# Patient Record
Sex: MALE | Race: OTHER | Employment: OTHER | URBAN - METROPOLITAN AREA
[De-identification: names, ages, dates, MRNs, and addresses within clinical notes are randomized per-mention and may not be internally consistent; named-entity substitution may affect disease eponyms.]

---

## 2020-02-06 PROBLEM — R97.20 ELEVATED PSA: Status: ACTIVE | Noted: 2020-02-06

## 2020-02-10 PROBLEM — R35.1 NOCTURIA: Status: ACTIVE | Noted: 2020-02-10

## 2020-02-10 PROBLEM — C61 MALIGNANT NEOPLASM OF PROSTATE (HCC): Status: ACTIVE | Noted: 2020-02-06

## 2022-01-16 ENCOUNTER — HOSPITAL ENCOUNTER (INPATIENT)
Age: 87
LOS: 2 days | Discharge: HOME HEALTH | DRG: 064 | End: 2022-01-19
Attending: EMERGENCY MEDICINE | Admitting: INTERNAL MEDICINE
Payer: MEDICARE

## 2022-01-16 DIAGNOSIS — R20.0 NUMBNESS: Primary | ICD-10-CM

## 2022-01-16 PROCEDURE — 99285 EMERGENCY DEPT VISIT HI MDM: CPT

## 2022-01-17 ENCOUNTER — APPOINTMENT (OUTPATIENT)
Dept: CT IMAGING | Age: 87
DRG: 064 | End: 2022-01-17
Attending: EMERGENCY MEDICINE
Payer: MEDICARE

## 2022-01-17 ENCOUNTER — APPOINTMENT (OUTPATIENT)
Dept: MRI IMAGING | Age: 87
DRG: 064 | End: 2022-01-17
Attending: EMERGENCY MEDICINE
Payer: MEDICARE

## 2022-01-17 ENCOUNTER — APPOINTMENT (OUTPATIENT)
Dept: GENERAL RADIOLOGY | Age: 87
DRG: 064 | End: 2022-01-17
Attending: INTERNAL MEDICINE
Payer: MEDICARE

## 2022-01-17 PROBLEM — I63.9 CVA (CEREBRAL VASCULAR ACCIDENT) (HCC): Status: ACTIVE | Noted: 2022-01-17

## 2022-01-17 LAB
ALBUMIN SERPL-MCNC: 4.1 G/DL (ref 3.4–5)
ALBUMIN/GLOB SERPL: 1.1 {RATIO} (ref 0.8–1.7)
ALP SERPL-CCNC: 156 U/L (ref 45–117)
ALT SERPL-CCNC: 36 U/L (ref 16–61)
ANION GAP SERPL CALC-SCNC: 9 MMOL/L (ref 3–18)
AST SERPL-CCNC: 44 U/L (ref 10–38)
BASOPHILS # BLD: 0 K/UL (ref 0–0.1)
BASOPHILS NFR BLD: 1 % (ref 0–2)
BILIRUB SERPL-MCNC: 0.6 MG/DL (ref 0.2–1)
BUN SERPL-MCNC: 13 MG/DL (ref 7–18)
BUN/CREAT SERPL: 11 (ref 12–20)
CALCIUM SERPL-MCNC: 9.1 MG/DL (ref 8.5–10.1)
CHLORIDE SERPL-SCNC: 106 MMOL/L (ref 100–111)
CO2 SERPL-SCNC: 26 MMOL/L (ref 21–32)
CREAT SERPL-MCNC: 1.17 MG/DL (ref 0.6–1.3)
DIFFERENTIAL METHOD BLD: ABNORMAL
EOSINOPHIL # BLD: 0.1 K/UL (ref 0–0.4)
EOSINOPHIL NFR BLD: 2 % (ref 0–5)
ERYTHROCYTE [DISTWIDTH] IN BLOOD BY AUTOMATED COUNT: 14.6 % (ref 11.6–14.5)
EST. AVERAGE GLUCOSE BLD GHB EST-MCNC: 123 MG/DL
FOLATE SERPL-MCNC: 17.4 NG/ML (ref 3.1–17.5)
GLOBULIN SER CALC-MCNC: 3.6 G/DL (ref 2–4)
GLUCOSE SERPL-MCNC: 114 MG/DL (ref 74–99)
HBA1C MFR BLD: 5.9 % (ref 4.2–5.6)
HCT VFR BLD AUTO: 44.6 % (ref 36–48)
HGB BLD-MCNC: 14.9 G/DL (ref 13–16)
IMM GRANULOCYTES # BLD AUTO: 0 K/UL (ref 0–0.04)
IMM GRANULOCYTES NFR BLD AUTO: 0 % (ref 0–0.5)
INR PPP: 1.2 (ref 0.8–1.2)
LYMPHOCYTES # BLD: 1 K/UL (ref 0.9–3.6)
LYMPHOCYTES NFR BLD: 18 % (ref 21–52)
MCH RBC QN AUTO: 29.4 PG (ref 24–34)
MCHC RBC AUTO-ENTMCNC: 33.4 G/DL (ref 31–37)
MCV RBC AUTO: 88.1 FL (ref 78–100)
MONOCYTES # BLD: 0.6 K/UL (ref 0.05–1.2)
MONOCYTES NFR BLD: 10 % (ref 3–10)
NEUTS SEG # BLD: 3.9 K/UL (ref 1.8–8)
NEUTS SEG NFR BLD: 69 % (ref 40–73)
NRBC # BLD: 0 K/UL (ref 0–0.01)
NRBC BLD-RTO: 0 PER 100 WBC
PLATELET # BLD AUTO: 386 K/UL (ref 135–420)
PMV BLD AUTO: 8.9 FL (ref 9.2–11.8)
POTASSIUM SERPL-SCNC: 3.3 MMOL/L (ref 3.5–5.5)
PROT SERPL-MCNC: 7.7 G/DL (ref 6.4–8.2)
PROTHROMBIN TIME: 14.1 SEC (ref 11.5–15.2)
RBC # BLD AUTO: 5.06 M/UL (ref 4.35–5.65)
SODIUM SERPL-SCNC: 141 MMOL/L (ref 136–145)
TROPONIN-HIGH SENSITIVITY: 43 NG/L (ref 0–78)
VIT B12 SERPL-MCNC: 274 PG/ML (ref 211–911)
WBC # BLD AUTO: 5.7 K/UL (ref 4.6–13.2)

## 2022-01-17 PROCEDURE — 74011250637 HC RX REV CODE- 250/637: Performed by: INTERNAL MEDICINE

## 2022-01-17 PROCEDURE — 70551 MRI BRAIN STEM W/O DYE: CPT

## 2022-01-17 PROCEDURE — 97162 PT EVAL MOD COMPLEX 30 MIN: CPT

## 2022-01-17 PROCEDURE — 65660000000 HC RM CCU STEPDOWN

## 2022-01-17 PROCEDURE — 82607 VITAMIN B-12: CPT

## 2022-01-17 PROCEDURE — 85610 PROTHROMBIN TIME: CPT

## 2022-01-17 PROCEDURE — 92610 EVALUATE SWALLOWING FUNCTION: CPT

## 2022-01-17 PROCEDURE — 85025 COMPLETE CBC W/AUTO DIFF WBC: CPT

## 2022-01-17 PROCEDURE — 84484 ASSAY OF TROPONIN QUANT: CPT

## 2022-01-17 PROCEDURE — 70496 CT ANGIOGRAPHY HEAD: CPT

## 2022-01-17 PROCEDURE — 80053 COMPREHEN METABOLIC PANEL: CPT

## 2022-01-17 PROCEDURE — 74011000636 HC RX REV CODE- 636: Performed by: EMERGENCY MEDICINE

## 2022-01-17 PROCEDURE — 70450 CT HEAD/BRAIN W/O DYE: CPT

## 2022-01-17 PROCEDURE — 97535 SELF CARE MNGMENT TRAINING: CPT

## 2022-01-17 PROCEDURE — 97116 GAIT TRAINING THERAPY: CPT

## 2022-01-17 PROCEDURE — 74011250637 HC RX REV CODE- 250/637: Performed by: EMERGENCY MEDICINE

## 2022-01-17 PROCEDURE — 71045 X-RAY EXAM CHEST 1 VIEW: CPT

## 2022-01-17 PROCEDURE — 97166 OT EVAL MOD COMPLEX 45 MIN: CPT

## 2022-01-17 PROCEDURE — 83036 HEMOGLOBIN GLYCOSYLATED A1C: CPT

## 2022-01-17 PROCEDURE — 74011250636 HC RX REV CODE- 250/636: Performed by: INTERNAL MEDICINE

## 2022-01-17 RX ORDER — ASPIRIN 325 MG
325 TABLET ORAL
Status: COMPLETED | OUTPATIENT
Start: 2022-01-17 | End: 2022-01-17

## 2022-01-17 RX ORDER — SODIUM CHLORIDE 9 MG/ML
25 INJECTION, SOLUTION INTRAVENOUS AS NEEDED
Status: DISCONTINUED | OUTPATIENT
Start: 2022-01-17 | End: 2022-01-19 | Stop reason: HOSPADM

## 2022-01-17 RX ORDER — ATORVASTATIN CALCIUM 20 MG/1
80 TABLET, FILM COATED ORAL
Status: DISCONTINUED | OUTPATIENT
Start: 2022-01-17 | End: 2022-01-19 | Stop reason: HOSPADM

## 2022-01-17 RX ORDER — GUAIFENESIN 100 MG/5ML
81 LIQUID (ML) ORAL DAILY
Status: DISCONTINUED | OUTPATIENT
Start: 2022-01-17 | End: 2022-01-17

## 2022-01-17 RX ORDER — TRAZODONE HYDROCHLORIDE 100 MG/1
100 TABLET ORAL
Status: DISCONTINUED | OUTPATIENT
Start: 2022-01-17 | End: 2022-01-19 | Stop reason: HOSPADM

## 2022-01-17 RX ORDER — CLOPIDOGREL BISULFATE 75 MG/1
300 TABLET ORAL ONCE
Status: COMPLETED | OUTPATIENT
Start: 2022-01-17 | End: 2022-01-17

## 2022-01-17 RX ORDER — CLOPIDOGREL BISULFATE 75 MG/1
75 TABLET ORAL DAILY
Status: DISCONTINUED | OUTPATIENT
Start: 2022-01-18 | End: 2022-01-19 | Stop reason: HOSPADM

## 2022-01-17 RX ORDER — HEPARIN SODIUM 5000 [USP'U]/ML
5000 INJECTION, SOLUTION INTRAVENOUS; SUBCUTANEOUS EVERY 8 HOURS
Status: DISCONTINUED | OUTPATIENT
Start: 2022-01-17 | End: 2022-01-19 | Stop reason: HOSPADM

## 2022-01-17 RX ORDER — GUAIFENESIN 100 MG/5ML
81 LIQUID (ML) ORAL DAILY
Status: DISCONTINUED | OUTPATIENT
Start: 2022-01-17 | End: 2022-01-19 | Stop reason: HOSPADM

## 2022-01-17 RX ORDER — ACETAMINOPHEN 325 MG/1
650 TABLET ORAL
Status: DISCONTINUED | OUTPATIENT
Start: 2022-01-17 | End: 2022-01-19 | Stop reason: HOSPADM

## 2022-01-17 RX ORDER — SODIUM CHLORIDE 9 MG/ML
125 INJECTION, SOLUTION INTRAVENOUS CONTINUOUS
Status: DISCONTINUED | OUTPATIENT
Start: 2022-01-17 | End: 2022-01-19 | Stop reason: HOSPADM

## 2022-01-17 RX ORDER — GABAPENTIN 300 MG/1
300 CAPSULE ORAL 2 TIMES DAILY
Status: DISCONTINUED | OUTPATIENT
Start: 2022-01-17 | End: 2022-01-19 | Stop reason: HOSPADM

## 2022-01-17 RX ORDER — ALBUTEROL SULFATE 90 UG/1
2 AEROSOL, METERED RESPIRATORY (INHALATION)
Status: DISCONTINUED | OUTPATIENT
Start: 2022-01-17 | End: 2022-01-19 | Stop reason: HOSPADM

## 2022-01-17 RX ADMIN — ATORVASTATIN CALCIUM 80 MG: 20 TABLET, FILM COATED ORAL at 22:45

## 2022-01-17 RX ADMIN — HEPARIN SODIUM 5000 UNITS: 5000 INJECTION INTRAVENOUS; SUBCUTANEOUS at 08:00

## 2022-01-17 RX ADMIN — ALBUTEROL SULFATE 2 PUFF: 108 INHALANT RESPIRATORY (INHALATION) at 16:00

## 2022-01-17 RX ADMIN — ALBUTEROL SULFATE 2 PUFF: 108 INHALANT RESPIRATORY (INHALATION) at 08:49

## 2022-01-17 RX ADMIN — IOPAMIDOL 100 ML: 755 INJECTION, SOLUTION INTRAVENOUS at 07:16

## 2022-01-17 RX ADMIN — ALBUTEROL SULFATE 2 PUFF: 108 INHALANT RESPIRATORY (INHALATION) at 22:46

## 2022-01-17 RX ADMIN — TRAZODONE HYDROCHLORIDE 100 MG: 100 TABLET ORAL at 22:45

## 2022-01-17 RX ADMIN — CLOPIDOGREL BISULFATE 300 MG: 75 TABLET ORAL at 06:57

## 2022-01-17 RX ADMIN — GABAPENTIN 300 MG: 300 CAPSULE ORAL at 22:45

## 2022-01-17 RX ADMIN — HEPARIN SODIUM 5000 UNITS: 5000 INJECTION INTRAVENOUS; SUBCUTANEOUS at 16:57

## 2022-01-17 RX ADMIN — ACETAMINOPHEN 650 MG: 325 TABLET ORAL at 22:44

## 2022-01-17 RX ADMIN — ASPIRIN 325 MG ORAL TABLET 325 MG: 325 PILL ORAL at 06:57

## 2022-01-17 RX ADMIN — SODIUM CHLORIDE 125 ML/HR: 9 INJECTION, SOLUTION INTRAVENOUS at 07:58

## 2022-01-17 RX ADMIN — GABAPENTIN 300 MG: 300 CAPSULE ORAL at 10:20

## 2022-01-17 RX ADMIN — LEVOTHYROXINE SODIUM 175 MCG: 0.1 TABLET ORAL at 08:49

## 2022-01-17 RX ADMIN — ALBUTEROL SULFATE 2 PUFF: 108 INHALANT RESPIRATORY (INHALATION) at 13:48

## 2022-01-17 RX ADMIN — HEPARIN SODIUM 5000 UNITS: 5000 INJECTION INTRAVENOUS; SUBCUTANEOUS at 22:45

## 2022-01-17 NOTE — ED TRIAGE NOTES
Pt arrives to ed per reporting left sided tingling that began two days ago. pt received covid vaccine on Thursday.  Pt does have slight speech stutter which is abnormal.

## 2022-01-17 NOTE — ED PROVIDER NOTES
I took over care of this patient from KATHIE Dickerson at normal turnover shift. Patient presented to the emergency department with 48 hours of extremity numbness he has a history of hypertension, prior CVAs, prostate cancer. He contributed this to his COVID-19 booster shot which was 3 days ago. He was worked up and on CT scan there was a indeterminate lesion read as a possible lacunar infarct of unknown timeline. MRI was done and awaiting results. MRI does show acute/subacute ischemia in the PCA distribution. Patient is 48 hours out from onset of symptoms and thus was never a tPA candidate. Rosana with neurology who asked for a CTA of the head and neck. Patient will be admitted for further investigation and treatment for acute stroke.

## 2022-01-17 NOTE — PROGRESS NOTES
Problem: Mobility Impaired (Adult and Pediatric)  Goal: *Acute Goals and Plan of Care (Insert Text)  Description: Physical Therapy Goals   Initiated 2022 and to be accomplished within 5-7 day(s)  1. Patient will move from supine <> sit with mod I in prep for out of bed activity and change of position. 2.  Patient will perform sit<> stand with mod I with LRAD in prep for transfers/ambulation. 3.  Patient will transfer from bed <> chair with mod I with LRAD for time up in chair for completion of ADL activity. 4.  Patient will ambulate 100 feet with mod I/LRAD for improved functional mobility at discharge. 5.  Patient will ascend/descend 3-5 stairs with handrail(s) with contact guard assist/S for home re-entry as needed. Outcome: Progressing Towards Goal    PHYSICAL THERAPY EVALUATION    Patient: Irma Martínez (89 y.o. male)  Date: 2022  Primary Diagnosis: CVA (cerebral vascular accident) [facility]) [I63.9]  Precautions:   Fall  PLOF: independent amb w/o AD     ASSESSMENT :  Based on the objective data described below, the patient is seen on telemetry unit. Pt presents s/p CVA with MRI revealin. Patchy areas of acute infarction involving the right posterior cerebral artery distribution, and 2. Mild chronic small vessel changes with old left basal ganglia/corona radiata infarct. Pt presents with IV, Ostomy bag L abdomen, and reports limited vision L eye due to macular degeneration. Pt with decr'd independence in functional mobility r/t L side weakness, decr'd standing balance/coordination mildly and mild decr'd safety awareness. Pt supine<>sit<>stand with supervision/CGA. Able to participate with GT/RW/CGA 70ft with fair balance, decr'd foot clearance and occasion decr'd coordination L LE during swing. Pt returned to room and c/o painful lump mid scapula L. Area exquisitely TTP. Nurse Herbert Aldrich in to observe same. Pain 0/10 at rest; 8/10 with palpation or pressure from lying on back.   Area present ~6 mths per pt. Pt left in bed with all needs in reach, and nurse Candace Holt aware. Answered pt questions regarding PT and mobility. Recommend HHPT vs OP PT (pending progress) for follow up physical therapy upon discharge to reach maximal level of independence/safety with functional mobility. Patient will benefit from skilled intervention to address the above impairments. Pt Education: Role of physical therapy in acute care setting, fall prevention and safety/technique during functional mobility tasks    Patient's rehabilitation potential is considered to be Good  Factors which may influence rehabilitation potential include:   []         None noted  []         Mental ability/status  [x]         Medical condition  []         Home/family situation and support systems  [x]         Safety awareness  []         Pain tolerance/management  []         Other:      PLAN :  Recommendations and Planned Interventions:   [x]           Bed Mobility Training             [x]    Neuromuscular Re-Education  [x]           Transfer Training                   []    Orthotic/Prosthetic Training  [x]           Gait Training                          []    Modalities  [x]           Therapeutic Exercises           []    Edema Management/Control  [x]           Therapeutic Activities            []    Family Training/Education  [x]           Patient Education  []           Other (comment):    Frequency/Duration: Patient will be followed by physical therapy 1-2 times per day/4-7 days per week to address goals. Discharge Recommendations: Home Physical Therapy and Outpatient  Further Equipment Recommendations for Discharge: N/A     SUBJECTIVE:   Patient stated I think I'm doing alright. 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)     prostate and thyroid    Chest pain, precordial     Dementia (HCC)     DJD (degenerative joint disease)     Hyperlipidemia Hypertension     Stroke [facility])     Syncope     Thyroid disease     cancer- twice     Past Surgical History:   Procedure Laterality Date    HX CARPAL TUNNEL RELEASE      twice    HX HIP REPLACEMENT      two    HX ORTHOPAEDIC      bilateral hip surgery    HX PROSTATECTOMY      cancer    HX THORACOTOMY      dx:  amyloidosis    [place] REPAIR ROTATOR CUFF,CHRONIC      three times    THYROIDECTOMY      cancer     Barriers to Learning/Limitations: yes;  sensory deficits-vision/hearing/speech, physical, and other reports h/o macular degeneration L eye per pt  Compensate with: Visual Cues, Verbal Cues, and Tactile Cues  Home Situation:  Home Situation  Home Environment: Trailer/mobile home  # Steps to Enter: 5  Rails to Enter: Yes  Hand Rails : Bilateral  One/Two Story Residence: One story  Living Alone: No  Support Systems: Spouse/Significant Other  Patient Expects to be Discharged to[de-identified] Trailer/mobile home  Current DME Used/Available at Home: [place] Or[employer], rolling,Walker, rollator  Tub or Shower Type: Tub/Shower combination  Critical Behavior:  Neurologic State: Alert; Appropriate for age  Orientation Level: Oriented X4  Cognition: Appropriate decision making; Appropriate for age attention/concentration; Follows commands  Safety/Judgement: Awareness of environment; Fall prevention  Psychosocial  Patient Behaviors: Calm; Cooperative  Purposeful Interaction: Yes  Pt Identified Daily Priority: Clinical issues (comment)  [employer] Process: Teaching/learning;Establish trust;Attend basic human needs  Caring Interventions: Reassure  Reassure:  Therapeutic listening  Therapeutic Modalities: Deep breathing  Strength:    Strength: Generally decreased, functional (R LE grossly 5/5, L LE grossly 4 to+/5)  Manual Muscle Testing (LE)                                                  R                    L                     Hip Flexion:                         5  /5                 4+  /5  Knee EXT:                             5 /5                  5 /5  Knee FLEX:                           5 /5                  4+ /5  Ankle DF:                              5 /5                   4+/5  Tone & Sensation:   Tone: Normal  Sensation: Intact (reports numbness L side from bottom lip to toes)  Range Of Motion:  AROM: Within functional limits (BLE's)  Functional Mobility:  Bed Mobility:  Supine to Sit: Supervision  Sit to Supine: Supervision  Scooting: Supervision  Transfers:  Sit to Stand: Contact guard assistance;Stand-by assistance  Stand to Sit: Contact guard assistance;Stand-by assistance  Balance:  Sitting: Intact  Standing: With support; Impaired  Standing - Static: Good;Fair  Standing - Dynamic : Fair  Ambulation/Gait Training:  Distance (ft): 70 Feet (ft)  Assistive Device: Gait belt;Walker, rolling  Ambulation - Level of Assistance: Contact guard assistance  Gait Description (WDL): Exceptions to WDL  Gait Abnormalities: Decreased step clearance  Speed/Kristel: Pace decreased (<100 feet/min)  Interventions: Safety awareness training; Tactile cues; Verbal cues; Visual/Demos  Pain:  Pain level pre-treatment: 0/10  Pain level post-treatment: 5/10 post R mid scapula-lump noted and nurse Candace Holt in to observe  Pain Intervention(s) : Medication (see MAR); Rest, Ice, Repositioning  Response to intervention: Nurse notified, See doc flow  Activity Tolerance:   Fair   Please refer to the flowsheet for vital signs taken during this treatment. After treatment:   []         Patient left in no apparent distress sitting up in chair  [x]         Patient left in no apparent distress in bed  [x]         Call bell left within reach  [x]         Nursing notified  []         Caregiver present  []         Bed alarm activated  []         SCDs applied    COMMUNICATION/EDUCATION:   [x]         Role of Physical Therapy in the acute care setting. [x]         Fall prevention education was provided and the patient/caregiver indicated understanding.   [x]         Patient/family have participated as able in goal setting and plan of care. [x]         Patient/family agree to work toward stated goals and plan of care. []         Patient understands intent and goals of therapy, but is neutral about his/her participation. []         Patient is unable to participate in goal setting/plan of care: ongoing with therapy staff.  []         Other:     Thank you for this referral.  Marivel Bernal, PT   Time Calculation: 33 mins      Eval Complexity: History: Exam:HIGH Complexity : 4+ Standardized tests and measures addressing body structure, function, activity limitation and / or participation in recreation  Presentation: MEDIUM Complexity : Evolving with changing characteristics  Clinical Decision Making:Medium Complexity    Overall Complexity:MEDIUM

## 2022-01-17 NOTE — PROGRESS NOTES
Reason for Admission:  Chart reviewed; per H&P, patient is a \"82 y.o.  male who history of prostate colostomy hypertension presents to the emergency room with left-sided numbness and tingling that started in the face left arm and left leg patient's symptoms started on Friday and has not progressed with some dysarthria. Patient received a COVID booster [place] on Thursday 24 hours prior to onset of symptoms. Patient admits to not being compliant with his hypertensive and cholesterol medicine he has not been taking aspirin.    \"                     RUR Score:      Low, 9%               Plan for utilizing home health:    TBD      PCP: First and Last name:  Ady Valentine DO     Name of Practice:    Are you a current patient: Yes/No:    Approximate date of last visit:    Can you participate in a virtual visit with your PCP:                     Current Advanced Directive/Advance Care Plan: Full Code      Healthcare Decision Maker:   Click here to complete [id number] including selection of the Healthcare Decision Maker Relationship (ie \"Primary\")                             Transition of Care Plan:

## 2022-01-17 NOTE — ED PROVIDER NOTES
EMERGENCY DEPARTMENT HISTORY AND PHYSICAL EXAM    Date: 1/16/2022  Patient Name: Ani So    History of Presenting Illness     Chief Complaint   Patient presents with    Numbness         History Provided By: Patient    Additional History (Context): Ani So is a 82 y.o. male with hypertension, hyperlipidemia, stroke and malignancy who presents with complaint of left-sided numbness from his face to his legs. Patient said that this began 48 hours ago approximately 1 day after getting the COVID vaccination booster. Denies weakness. Denies gait instability changes in speech or vision. Denies strength change. He says he just feels like his body is asleep on the left side. PCP: Radha Reddy, DO    Current Outpatient Medications   Medication Sig Dispense Refill    docusate sodium (COLACE) 100 mg capsule Take 100 mg by mouth two (2) times a day.  gabapentin (NEURONTIN) 300 mg capsule Take 300 mg by mouth two (2) times a day.  naproxen (NAPROSYN) 375 mg tablet       omega 3-dha-epa-fish oil 60- mg cap Take 500 mg by mouth.  pravastatin (PRAVACHOL) 40 mg tablet       ascorbic acid, vitamin C, (VITAMIN C) 100 mg tab VITAMIN C TABS      ibuprofen (MOTRIN) 800 mg tablet       metoprolol tartrate (LOPRESSOR) 25 mg tablet Take 1 Tab by mouth.  traZODone (DESYREL) 150 mg tablet       co-enzyme Q-10 (CO Q-10) 100 mg capsule       lisinopril (PRINIVIL, ZESTRIL) 20 mg tablet Take  by mouth daily.  multivitamin (ONE A DAY) tablet Take 1 Tab by mouth daily.  aspirin 81 mg chewable tablet Take 81 mg by mouth daily.  AMLODIPINE BESYLATE (NORVASC PO) Take  by mouth.  levothyroxine (LEVOTHROID) 175 mcg tablet Take 175 mcg by mouth Daily (before breakfast).            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)     prostate and thyroid    Chest pain, precordial     [facility]     DJD (degenerative joint disease)     Hyperlipidemia     Hypertension     Stroke ([facility]     Syncope     Thyroid disease     cancer- twice       Past Surgical History:  Past Surgical History:   Procedure Laterality Date    HX CARPAL TUNNEL RELEASE      twice    HX HIP REPLACEMENT      two    HX ORTHOPAEDIC      bilateral hip surgery    HX PROSTATECTOMY      cancer    HX THORACOTOMY      dx:  amyloidosis    [place] REPAIR ROTATOR CUFF,CHRONIC      three times    THYROIDECTOMY      cancer       Family History:  No family history on file. Social History:  Social History     Tobacco Use    Smoking status: Never Smoker    Smokeless tobacco: Never Used   Substance Use Topics    Alcohol use: No    Drug use: No       Allergies: Allergies   Allergen Reactions    Morphine Other (comments)     \"goes crazy\"         Review of Systems   Review of Systems   Constitutional: Negative for fever. HENT: Negative. Eyes: Negative. Negative for visual disturbance. Cardiovascular: Negative for chest pain. Gastrointestinal: Negative. Negative for nausea and vomiting. Endocrine: Negative. Genitourinary: Negative. Musculoskeletal: Negative for gait problem and myalgias. Skin: Negative. Neurological: Positive for numbness. Negative for dizziness, seizures, syncope, speech difficulty, weakness and light-headedness. Hematological: Does not bruise/bleed easily. Psychiatric/Behavioral: Negative. All Other Systems Negative  Physical Exam     Vitals:    01/16/22 2309 01/17/22 0138   BP: (!) 162/93    Pulse: 85    Resp: 18    Temp:  98.2 °F (36.8 °C)   SpO2:  98%     Physical Exam  Vitals and nursing note reviewed. Constitutional:       General: He is not in acute distress. Appearance: He is well-developed. He is not ill-appearing, toxic-appearing or diaphoretic. HENT:      Head: Normocephalic and atraumatic. Eyes:      Extraocular Movements: Extraocular movements intact. Comments: No nystagmus. Neck:      Thyroid: No thyromegaly. Vascular: No carotid bruit. Trachea: No tracheal deviation. Cardiovascular:      Rate and Rhythm: Normal rate and regular rhythm. Pulses: Normal pulses. Heart sounds: Normal heart sounds. No murmur heard. No friction rub. No gallop. Pulmonary:      Effort: Pulmonary effort is normal. No respiratory distress. Breath sounds: Normal breath sounds. No stridor. No wheezing or rales. Chest:      Chest wall: No tenderness. Abdominal:      General: There is no distension. Palpations: Abdomen is soft. There is no mass. Tenderness: There is no abdominal tenderness. There is no guarding or rebound. Musculoskeletal:         General: Normal range of motion. Cervical back: Normal range of motion and neck supple. Skin:     General: Skin is warm and dry. Coloration: Skin is not pale. Neurological:      Mental Status: He is alert and oriented to person, place, and time. Cranial Nerves: No cranial nerve deficit. Sensory: No sensory deficit. Motor: No weakness. Coordination: Coordination normal.      Comments: Subjective only perception of sensation to light touch from left to right. Psychiatric:         Speech: Speech normal.         Behavior: Behavior normal.         Thought Content:  Thought content normal.         Judgment: Judgment normal.          Diagnostic Study Results     Labs -     Recent Results (from the past 12 hour(s))   CBC WITH AUTOMATED DIFF    Collection Time: 01/17/22 12:09 AM   Result Value Ref Range    WBC 5.7 4.6 - 13.2 K/uL    RBC 5.06 4.35 - 5.65 M/uL    HGB 14.9 13.0 - 16.0 g/dL    HCT 44.6 36.0 - 48.0 %    MCV 88.1 78.0 - 100.0 FL    MCH 29.4 24.0 - 34.0 PG    MCHC 33.4 31.0 - 37.0 g/dL    RDW 14.6 (H) 11.6 - 14.5 %    PLATELET 249 896 - 208 K/uL    MPV 8.9 (L) 9.2 - 11.8 FL    NRBC 0.0 0  WBC    ABSOLUTE NRBC 0.00 0.00 - 0.01 K/uL    NEUTROPHILS 69 40 - 73 % LYMPHOCYTES 18 (L) 21 - 52 %    MONOCYTES 10 3 - 10 %    EOSINOPHILS 2 0 - 5 %    BASOPHILS 1 0 - 2 %    IMMATURE GRANULOCYTES 0 0.0 - 0.5 %    ABS. NEUTROPHILS 3.9 1.8 - 8.0 K/UL    ABS. LYMPHOCYTES 1.0 0.9 - 3.6 K/UL    ABS. MONOCYTES 0.6 0.05 - 1.2 K/UL    ABS. EOSINOPHILS 0.1 0.0 - 0.4 K/UL    ABS. BASOPHILS 0.0 0.0 - 0.1 K/UL    ABS. IMM. GRANS. 0.0 0.00 - 0.04 K/UL    DF AUTOMATED     METABOLIC PANEL, COMPREHENSIVE    Collection Time: 01/17/22 12:09 AM   Result Value Ref Range    Sodium 141 136 - 145 mmol/L    Potassium 3.3 (L) 3.5 - 5.5 mmol/L    Chloride 106 100 - 111 mmol/L    CO2 26 21 - 32 mmol/L    Anion gap 9 3.0 - 18 mmol/L    Glucose 114 (H) 74 - 99 mg/dL    BUN 13 7.0 - 18 MG/DL    Creatinine 1.17 0.6 - 1.3 MG/DL    BUN/Creatinine ratio 11 (L) 12 - 20      GFR est AA >60 >60 ml/min/1.73m2    GFR est non-AA 59 (L) >60 ml/min/1.73m2    Calcium 9.1 8.5 - 10.1 MG/DL    Bilirubin, total 0.6 0.2 - 1.0 MG/DL    ALT (SGPT) 36 16 - 61 U/L    AST (SGOT) 44 (H) 10 - 38 U/L    Alk. phosphatase 156 (H) 45 - 117 U/L    Protein, total 7.7 6.4 - 8.2 g/dL    Albumin 4.1 3.4 - 5.0 g/dL    Globulin 3.6 2.0 - 4.0 g/dL    A-G Ratio 1.1 0.8 - 1.7     TROPONIN-HIGH SENSITIVITY    Collection Time: 01/17/22 12:09 AM   Result Value Ref Range    Troponin-High Sensitivity 43 0 - 78 ng/L   PROTHROMBIN TIME + INR    Collection Time: 01/17/22 12:09 AM   Result Value Ref Range    Prothrombin time 14.1 11.5 - 15.2 sec    INR 1.2 0.8 - 1.2         Radiologic Studies -   CT HEAD WO CONT   Final Result      Cerebral volume loss and mild bilateral periventricular and central white matter   diminished attenuation which is nonspecific but likely to represent   microvascular disease. Posterior lateral right thalamic/posterior right internal capsular small   hypodensity possibly a lacunar infarct of uncertain acuity. Small right occipital hypodensity possibly an old infarct.             MRI BRAIN WO CONT    (Results Pending) CT Results  (Last 48 hours)               01/17/22 0040  CT HEAD WO CONT Final result    Impression:      Cerebral volume loss and mild bilateral periventricular and central white matter   diminished attenuation which is nonspecific but likely to represent   microvascular disease. Posterior lateral right thalamic/posterior right internal capsular small   hypodensity possibly a lacunar infarct of uncertain acuity. Small right occipital hypodensity possibly an old infarct. Narrative:  EXAM: CT head       INDICATION: Left-sided numbness for 48 hours. COMPARISON: November 29, 2013. TECHNIQUE: Axial CT imaging of the head was performed without intravenous   contrast. Dose reduction techniques used: automated exposure control, adjustment   of the mAs and/or kVp according to patient size, and iterative reconstruction   techniques. Digital imaging and communications in Medicine (DICOM) format image   data are available to nonaffiliated external healthcare facilities or entities   on a secure, media free, reciprocally searchable basis with patient   authorization for at least 12 months after this study. _______________       FINDINGS:       BRAIN AND POSTERIOR FOSSA: There is cerebral volume loss with prominence of the   lateral and the third ventricles. The cortical sulci are widened appropriately. The fourth ventricle and basal cisterns are normally outlined. There is mild   bilateral periventricular and central white matter diminished attenuation. There   is a small hypodensity lateral posterior right thalamus/posterior right internal   capsule. There is a hypodensity right occipital lobe. There is no hemorrhage or   midline shift. EXTRA-AXIAL SPACES AND MENINGES: There are no abnormal extra-axial fluid   collections. CALVARIUM: Intact. SINUSES: Clear.        OTHER: None.       _______________               CXR Results  (Last 48 hours)    None Medical Decision Making   I am the first provider for this patient. I reviewed the vital signs, available nursing notes, past medical history, past surgical history, family history and social history. Vital Signs-Reviewed the patient's vital signs. Records Reviewed: Nursing Notes    Procedures:  Procedures    Provider Notes (Medical Decision Making): Patient with possible right-sided internal capsule thalamic infarct of questionable acuity. Will obtain MRI. Symptoms have been present now over 48 hours. Labs blood sugar unremarkable otherwise. 1:39 AM : Pt care transferred to Dr. Bertha Sim ,ED provider. History of patient complaint(s), available diagnostic reports and current treatment plan has been discussed thoroughly. Bedside rounding on patient occured : yes . Intended disposition of patient : TBD  Pending diagnostics reports and/or labs (please list): MRI    MED RECONCILIATION:  No current facility-administered medications for this encounter. Current Outpatient Medications   Medication Sig    docusate sodium (COLACE) 100 mg capsule Take 100 mg by mouth two (2) times a day.  gabapentin (NEURONTIN) 300 mg capsule Take 300 mg by mouth two (2) times a day.  naproxen (NAPROSYN) 375 mg tablet     omega 3-dha-epa-fish oil 60- mg cap Take 500 mg by mouth.  pravastatin (PRAVACHOL) 40 mg tablet     ascorbic acid, vitamin C, (VITAMIN C) 100 mg tab VITAMIN C TABS    ibuprofen (MOTRIN) 800 mg tablet     metoprolol tartrate (LOPRESSOR) 25 mg tablet Take 1 Tab by mouth.  traZODone (DESYREL) 150 mg tablet     co-enzyme Q-10 (CO Q-10) 100 mg capsule     lisinopril (PRINIVIL, ZESTRIL) 20 mg tablet Take  by mouth daily.  multivitamin (ONE A DAY) tablet Take 1 Tab by mouth daily.  aspirin 81 mg chewable tablet Take 81 mg by mouth daily.  AMLODIPINE BESYLATE (NORVASC PO) Take  by mouth.     levothyroxine (LEVOTHROID) 175 mcg tablet Take 175 mcg by mouth Daily (before breakfast). Disposition:  TBD      Follow-up Information     Follow up With Specialties Details Why Contact Info    Arden Novoa DO Family Medicine Schedule an appointment as soon as possible for a visit   [address] [place] [address]      THE [facility] EMERGENCY DEPT Emergency Medicine  If symptoms worsen return immediately [address] [place] [address]  [phone]          Current Discharge Medication List                Diagnosis     Clinical Impression:   1.  Numbness

## 2022-01-17 NOTE — CONSULTS
NEUROLOGY CONSULTATION NOTE    Patient: Jocelyne Lui MRN: [id number]  CSN: [id number]    YOB: 1935  Age: 82 y.o. Sex: male    DOA: 1/16/2022 LOS:  LOS: 0 days        Requesting Physician: Dr. Padma Campos and Dr. Sarah Suarez. Reason for Consultation: Stroke. HISTORY OF PRESENT ILLNESS:   Jocelyne Lui is a 82 y.o. right-handed male with history of prostate cancer, amyloidosis, memory impairment, prior stroke, hypertension and hyperlipidemia, who presented with left-sided numbness after booster vaccine for coronavirus. He started to feel numbness on the left side of his body including left arm, leg and left side of his mouth. He related this to coronavirus vaccine. Therefore, he did not present to ER for about 48 hours. When he presented, he was not a candidate for any intervention. Of note, the patient denies any weakness. He was supposed to be on aspirin but he is not taking aspirin. He underwent a head CT, which showed age-indeterminate infarcts and MRI showed multifocal infarcts on the right hemisphere including right thalamus and right PCA distribution infarcts. He was given a loading dose of aspirin and Plavix. The patient denies any vision changes, speech or swallowing difficulties. He denies remarkable weakness. Stroke Work-up:  Brain MRI:   1. Patchy areas of acute infarction involving the right posterior cerebral artery distribution, as described above. 2.  Mild chronic small vessel changes with old left basal ganglia/corona radiata infarct. CT Head: Cerebral volume loss and mild bilateral periventricular and central white matter diminished attenuation which is nonspecific but likely to represent microvascular disease. Posterior lateral right thalamic/posterior right internal capsular small hypodensity possibly a lacunar infarct of uncertain acuity. Small right occipital hypodensity possibly an old infarct. CTA of head and neck: 1.   Occlusion of the P2 segment of the right posterior cerebral artery, corresponding to the location of acute infarction on MRI. 2.  Atherosclerotic vascular disease with mild areas of stenosis in the neck and involving the petrocavernous junction of the right internal carotid artery. 3.  Postsurgical changes in the left upper lobe with atelectasis, scarring, and/or infiltrate. Dedicated imaging of the chest is suggested for further evaluation, as clinically warranted. Stenosis Key: Mild-less than 25% Mild to moderate- 25-50% Moderate- 40-60% Moderate to severe- 50-75% Severe-greater than 75%  Echocardiogram:   Lipid panel: No results found for: CHOL, CHOLPOCT, CHOLX, CHLST, CHOLV, [id number], HDL, HDLP, LDL, LDLC, DLDLP, [id number], VLDLC, VLDL, TGLX, TRIGL, TRIGP, TGLPOCT, CHHD, CHHDX  HbA1c:   No results found for: HBA1C, FPL2CBZY, [id number]  PAST MEDICAL HISTORY:  Past Medical History:   Diagnosis Date    Amyloidosis of lung ([facility] [address] 2012    Cancer [facility])     prostate and thyroid    Chest pain, precordial     Dementia ([facility] [address]     DJD (degenerative joint disease)     Hyperlipidemia     Hypertension     Stroke ([facility] [address]     Syncope     Thyroid disease     cancer- twice     PAST SURGICAL HISTORY:  Past Surgical History:   Procedure Laterality Date    HX CARPAL TUNNEL RELEASE      twice    HX HIP REPLACEMENT      two    HX ORTHOPAEDIC      bilateral hip surgery    HX PROSTATECTOMY      cancer    HX THORACOTOMY      dx:  amyloidosis    [place] REPAIR ROTATOR CUFF,CHRONIC      three times    THYROIDECTOMY      cancer     FAMILY HISTORY:  No family history on file.   SOCIAL HISTORY:  Social History     Socioeconomic History    Marital status:    Tobacco Use    Smoking status: Never Smoker    Smokeless tobacco: Never Used   Substance and Sexual Activity    Alcohol use: No    Drug use: No     MEDICATIONS:  Current Facility-Administered Medications   Medication Dose Route Frequency    0.9% sodium chloride infusion 25 mL  25 mL IntraVENous PRN    0.9% sodium chloride infusion  125 mL/hr IntraVENous CONTINUOUS    aspirin chewable tablet 81 mg  81 mg Oral DAILY    atorvastatin (LIPITOR) tablet 80 mg  80 mg Oral QHS    acetaminophen (TYLENOL) tablet 650 mg  650 mg Oral Q4H PRN    heparin (porcine) injection 5,000 Units  5,000 Units SubCUTAneous Q8H    albuterol (PROVENTIL HFA, VENTOLIN HFA, PROAIR HFA) inhaler 2 Puff  2 Puff Inhalation Q4H RT    traZODone (DESYREL) tablet 100 mg  100 mg Oral QHS    levothyroxine (SYNTHROID) tablet 175 mcg  175 mcg Oral ACB    gabapentin (NEURONTIN) capsule 300 mg  300 mg Oral BID    aspirin chewable tablet 81 mg  81 mg Oral DAILY     Current Outpatient Medications   Medication Sig    docusate sodium (COLACE) 100 mg capsule Take 100 mg by mouth two (2) times a day.  gabapentin (NEURONTIN) 300 mg capsule Take 300 mg by mouth two (2) times a day.  naproxen (NAPROSYN) 375 mg tablet     omega 3-dha-epa-fish oil 60- mg cap Take 500 mg by mouth.  pravastatin (PRAVACHOL) 40 mg tablet     ascorbic acid, vitamin C, (VITAMIN C) 100 mg tab VITAMIN C TABS    ibuprofen (MOTRIN) 800 mg tablet     metoprolol tartrate (LOPRESSOR) 25 mg tablet Take 1 Tab by mouth.  traZODone (DESYREL) 150 mg tablet     co-enzyme Q-10 (CO Q-10) 100 mg capsule     lisinopril (PRINIVIL, ZESTRIL) 20 mg tablet Take  by mouth daily.  multivitamin (ONE A DAY) tablet Take 1 Tab by mouth daily.  aspirin 81 mg chewable tablet Take 81 mg by mouth daily.  AMLODIPINE BESYLATE (NORVASC PO) Take  by mouth.  levothyroxine (LEVOTHROID) 175 mcg tablet Take 175 mcg by mouth Daily (before breakfast). Prior to Admission medications    Medication Sig Start Date End Date Taking? Authorizing Provider   docusate sodium (COLACE) 100 mg capsule Take 100 mg by mouth two (2) times a day. 3/12/20   Provider, Historical   gabapentin (NEURONTIN) 300 mg capsule Take 300 mg by mouth two (2) times a day.  3/12/20   Provider, Historical   naproxen (NAPROSYN) 375 mg tablet  6/1/20   Provider, Historical   omega 3-dha-epa-fish oil 60- mg cap Take 500 mg by mouth. Provider, Historical   pravastatin (PRAVACHOL) 40 mg tablet  8/17/20   Provider, Historical   ascorbic acid, vitamin C, (VITAMIN C) 100 mg tab VITAMIN C TABS 8/10/16   Provider, Historical   ibuprofen (MOTRIN) 800 mg tablet  2/2/20   Provider, Historical   metoprolol tartrate (LOPRESSOR) 25 mg tablet Take 1 Tab by mouth. 4/16/18   Provider, Historical   traZODone (DESYREL) 150 mg tablet  1/13/20   Provider, Historical   co-enzyme Q-10 (CO Q-10) 100 mg capsule  6/28/19   Provider, Historical   lisinopril (PRINIVIL, ZESTRIL) 20 mg tablet Take  by mouth daily. Provider, Historical   multivitamin (ONE A DAY) tablet Take 1 Tab by mouth daily. Provider, Historical   aspirin 81 mg chewable tablet Take 81 mg by mouth daily. Provider, Historical   AMLODIPINE BESYLATE (NORVASC PO) Take  by mouth. Other, MD Carrington   levothyroxine (LEVOTHROID) 175 mcg tablet Take 175 mcg by mouth Daily (before breakfast). Mello, MD Carrington       ALLERGIES:  Allergies   Allergen Reactions    Morphine Other (comments)     \"goes crazy\"       Review of Systems  GENERAL: No fevers or chills. HEENT: No change in vision, earache, tinnitus, sore throat or sinus congestion. NECK: No pain or stiffness. CARDIOVASCULAR: No chest pain or pressure. No palpitations. PULMONARY: No shortness of breath, cough or wheeze. GASTROINTESTINAL: No abdominal pain, nausea, vomiting or diarrhea. GENITOURINARY: No urinary frequency or dysuria. MUSCULOSKELETAL: No joint or muscle pain, no back pain, no recent trauma. DERMATOLOGIC: No rash, no itching, no lesions. ENDOCRINE: No heat or cold intolerance. HEMATOLOGICAL: No anemia or easy bruising or bleeding. NEUROLOGIC: No headache, seizures. See HPI.     PHYSICAL EXAMINATION:     Visit Vitals  BP (!) 185/69   Pulse (!) 53   Temp 98.3 °F (36.8 °C)   Resp 18   Ht 5' 5\" (1.651 m)   Wt 73.9 kg (163 lb)   SpO2 98%   BMI 27.12 kg/m²         GENERAL: Pleasant, in no apparent distress. HEENT: Moist mucous membranes, sclerae anicteric, scalp is atraumatic. CVS: Regular rate and rhythm, no murmurs or gallops. No carotid bruits. PULMONARY: Clear to auscultation bilaterally. No rales or rhonchi. No wheezing. EXTREMITIES: Normal range of motion at all sites. No deformities. ABDOMEN: Soft, nontender. SKIN: No rashes or ecchymoses. Warm and dry. NEUROLOGIC: Alert and oriented x3. Speech is fluent without any aphasia or dysarthria. Cranial nerves: Face is symmetric with symmetric smile. Facial sensation is decreased on the left. Extraocular movements are intact with no nystagmus. Visual fields are full to confrontation. PERRL. Tongue is midline. Palate elevates symmetrically. Hearing intact to speech. Sternocleidomastoid and trapezius strengths are full bilaterally. Motor: Normal tone and normal bulk on all four extremities. Strength is full on all four segmentally. There is no pronator drift or orbiting. Sensory: Decreased sensation to pinprick and touch on the left arm and leg. Coordination: Intact coordination with finger-nose-finger bilaterally. Normal fine movements. No bradykinesia detected. Deep tendon reflexes: 2+ at biceps, brachioradialis, patella and ankles bilaterally. Labs: Results:       Chemistry Recent Labs     01/17/22  0009   *      K 3.3*      CO2 26   BUN 13   CREA 1.17   CA 9.1   AGAP 9   BUCR 11*   *   TP 7.7   ALB 4.1   GLOB 3.6   AGRAT 1.1      CBC w/Diff Recent Labs     01/17/22  0009   WBC 5.7   RBC 5.06   HGB 14.9   HCT 44.6      GRANS 69   LYMPH 18*   EOS 2      Cardiac Enzymes No results for input(s): CPK, CKND1, ROXANA in the last 72 hours.     No lab exists for component: CKRMB, TROIP   Coagulation Recent Labs     01/17/22  0009   PTP 14.1   INR 1.2       Lipid Panel No results found for: CHOL, CHOLPOCT, CHOLX, CHLST, [address], [id number], HDL, HDLP, LDL, LDLC, DLDLP, [id number], VLDLC, VLDL, TGLX, TRIGL, TRIGP, TGLPOCT, CHHD, CHHDX   BNP No results for input(s): BNPP in the last 72 hours. Liver Enzymes Recent Labs     01/17/22  0009   TP 7.7   ALB 4.1   *      Thyroid Studies No results found for: T4, T3U, TSH, TSHEXT       Radiology:  MRI BRAIN WO CONT    Result Date: 1/17/2022  EXAM: MRI brain without contrast 1/17/2022. CLINICAL INDICATION/HISTORY: 2 day history of left-sided numbness COMPARISON: CT scan of the head from 1/17/2022 TECHNIQUE: Multiplanar multisequential images of the brain were obtained without contrast. _______________ FINDINGS: BRAIN AND POSTERIOR FOSSA: There is an area of acute infarct extending along the inferior aspect of the right occipital lobe. Patchy involvement extends along the inferior margin of the right posterior temporal lobe, including the mesial temporal lobe and there is patchy posterior extension into the occipital lobe. There is an associated 1 cm area of acute infarct along the lateral aspect of the right thalamus. Mild atrophy and chronic small vessel changes are present in the periventricular and subcortical white matter of both cerebral hemispheres. There is a small area of old infarct along the posterior aspect of the left putamen and extending into the white matter along the posterior body of the left lateral ventricle. There is no intracranial hemorrhage. Mild areas of local mass effect are noted in association with the acute infarct in the right cerebral hemisphere. There are no significant additional areas of mass effect. There is no midline shift. There are no significant additional areas of abnormal parenchymal signal. No additional areas of true restricted diffusion or demonstrable. EXTRA-AXIAL SPACES AND MENINGES: There are no abnormal extra-axial fluid collections. Jens Oleary  VASCULAR: The visualized portions of the intracranial carotid and vertebrobasilar systems demonstrate patent appearing flow voids. CALVARIA: Unremarkable. SINUSES: Clear, as visualized. CRANIOCERVICAL JUNCTION: Within normal limits for age. OTHER: None. _______________     1. Patchy areas of acute infarction involving the right posterior cerebral artery distribution, as described above. 2.  Mild chronic small vessel changes with old left basal ganglia/corona radiata infarct. Initial interpretation was provided to the emergency room by Belly Ballot [facility]. CT HEAD WO CONT    Result Date: 1/17/2022  EXAM: CT head INDICATION: Left-sided numbness for 48 hours. COMPARISON: November 29, 2013. TECHNIQUE: Axial CT imaging of the head was performed without intravenous contrast. Dose reduction techniques used: automated exposure control, adjustment of the mAs and/or kVp according to patient size, and iterative reconstruction techniques. Digital imaging and communications in Medicine (DICOM) format image data are available to nonaffiliated external healthcare facilities or entities on a secure, media free, reciprocally searchable basis with patient authorization for at least 12 months after this study. _______________ FINDINGS: BRAIN AND POSTERIOR FOSSA: There is cerebral volume loss with prominence of the lateral and the third ventricles. The cortical sulci are widened appropriately. The fourth ventricle and basal cisterns are normally outlined. There is mild bilateral periventricular and central white matter diminished attenuation. There is a small hypodensity lateral posterior right thalamus/posterior right internal capsule. There is a hypodensity right occipital lobe. There is no hemorrhage or midline shift. EXTRA-AXIAL SPACES AND MENINGES: There are no abnormal extra-axial fluid collections. CALVARIUM: Intact. SINUSES: Clear.  OTHER: None. _______________     Cerebral volume loss and mild bilateral periventricular and central white matter diminished attenuation which is nonspecific but likely to represent microvascular disease. Posterior lateral right thalamic/posterior right internal capsular small hypodensity possibly a lacunar infarct of uncertain acuity. Small right occipital hypodensity possibly an old infarct. CTA HEAD NECK W WO CONT    Result Date: 1/17/2022  CTA head and CTA neck with contrast 1/17/2022. CLINICAL INDICATION/HISTORY:   Left-sided paresthesias for 2 days. Stuttering speech. COMPARISON:   CT scan of the head from 1/17/2022. TECHNIQUE: Multiple axial CT images of the neck were obtained extending from the level of the aortic arch to the skull base during the dynamic infusion of 100 cc of [id number]-370 utilizing a CTA protocol. Multiple axial CT images of the head were obtained extending from below the level of the skull base to the vertex after the administration of the IV contrast utilizing a CTA protocol. Multiplanar reconstructions were obtained, including MIP reconstructions as well as curved planar reformats. Multiple additional 3-D surface rendering reformations were performed at a separate workstation. Dose reduction techniques:  Automated exposure control, mAs and/or kVp reductions based on patient size, and iterative reconstruction. The specific techniques utilized on this CT exam have been documented in the patient's electronic medical record. Digital imaging and communications and medicine (DICOM) format image data are available to nonaffiliated external healthcare facilities or entities on a secure, media free, reciprocally searchable basis with patient authorization for at least a 12 month period after this study. FINDINGS: GREAT VESSEL ORIGINS:   The origins of the great vessels from the aortic arch and the origin of the right common carotid artery from the innominate artery are patent. The great vessels are mildly tortuous suggesting the sequela of long-standing and/or uncontrolled hypertension.  CERVICAL VASCULATURE:   Both common carotid arteries are patent throughout the thoracic and cervical segments. A reverse bifurcation is incidentally noted on the left. There is eccentric plaque involving the origin of the right internal carotid artery. Mild stenosis is present, 0% by NASCET criteria based on the endoluminal reconstructions. There is also minimal irregularity of the left internal carotid artery origin, also with 0% stenosis by NASCET criteria. The cervical portions of both internal carotid arteries are otherwise patent. Left vertebral artery dominance is noted. Vertebral artery origins are patent. The right vertebral artery is partially obscured due to beam hardening artifacts related to dense contrast material within the adjacent veins but the vessel is grossly patent along its T1 segment. Both vertebral arteries are otherwise well visualized and patent throughout their cervical segments. INTRACRANIAL VASCULATURE:   Focal calcification is noted at the petrocavernous junction of the right internal carotid artery with mild stenosis. The carotid siphons are otherwise patent. The vertebrobasilar system is patent. There is abrupt occlusion of the P2 segment of the right posterior cerebral artery, corresponding to the distribution of acute infarction on the subsequently obtained MRI. There is no hemodynamically significant stenosis or occlusion involving the remainder of the main intracranial vessels. There is no evidence of aneurysm formation or underlying vascular malformation. OTHER:   Postsurgical changes are noted in the left upper lobe with left suprahilar atelectasis and/or scar. Underlying infiltrate cannot be excluded and the pulmonary processes are incompletely evaluated on this examination tailored for assessment of the cervical and intracranial vasculature. 1.  Occlusion of the P2 segment of the right posterior cerebral artery, corresponding to the location of acute infarction on MRI.  2.  Atherosclerotic vascular disease with mild areas of stenosis in the neck and involving the petrocavernous junction of the right internal carotid artery. 3.  Postsurgical changes in the left upper lobe with atelectasis, scarring, and/or infiltrate. Dedicated imaging of the chest is suggested for further evaluation, as clinically warranted. Stenosis Key: Mild-less than 25% Mild to moderate- 25-50% Moderate- 40-60% Moderate to severe- 50-75% Severe-greater than 75%     XR CHEST PORT    Result Date: 1/17/2022  CLINICAL HISTORY:  As above. COMPARISONS:  Chest x-ray 11/26/2015. Chest CT 11/26/2015 TECHNIQUE:  single frontal view of the chest ------------------------------------------ FINDINGS: Lungs:  Bandlike opacity in the left upper lobe with slight distortion of the left hilum, not appreciably changed and likely scar. No evidence of consolidation or definite acute lung process. No significant pleural fluid. Mediastinum: Unremarkable. Bones: No evidence of fracture or suspicious bone lesion. ------------------------------------------    No acute cardiopulmonary process. ASSESSMENT/IMPRESSION:  Right PCA infarct in the setting of right PCA occlusion. The patient is not a candidate for tPA or thrombectomy due to time of onset of symptoms. I am not sure if coronavirus vaccine and any bearing on the current clinical picture. The patient has risk factor for strokes already. He needs to be on aspirin, Plavix and high-dose statin. RECOMMENDATIONS:  1. Continue aspirin 81 mg and Plavix 75 mg daily. Continue atorvastatin 80 mg daily. 2.  Admit to telemetry with tele monitoring  3. Neuro checks per stroke protocol  4. Permissive hypertension for 1 day. (do not treat if BP is not >200/110, prefer prn labetolol 5mg)  5. IV normal saline continuous infusion 100-125 ml/h  6. Euglycemia with sliding scale insulin  7. Euthermia with acetaminophen 650mg Q6h prn for fever  8. Echocardiogram with bubble study  9. Lipid panel and hemoglobin A1c   10. SCDs and DVT prophylaxis   11.  PT/OT and SLP consults      I will follow the patient.  Please do not hesitate to return with any questions.    ------------------------------------  Rosa Isela Stewart MD  1/17/2022  11:27 AM

## 2022-01-17 NOTE — PROGRESS NOTES
SPEECH LANGUAGE PATHOLOGY BEDSIDE SWALLOW EVALUATION AND DISCHARGE    Patient: Geni Amador (89 y.o. male)  Date: 1/17/2022  Primary Diagnosis: CVA (cerebral vascular accident) ([facility] [address] [I63.9]        Precautions: reg/thin  PLOF: as written in H&P    ASSESSMENT :  Clinical beside swallow eval completed per MD orders. Pt A&Ox4. Functional communication. Intelligibility >90%. Cognitive-linguistic function appears intact. OM examination revealed residual overall labial and lingual weakness 2* CVA however all functional for mastication and deglutition. Presented with thin liquid and solid trials. Exhibited WFL bolus cohesion, manipulation and A-P transit. Further exhibited WFL swallow timing/reflex and hyolaryngeal excursion. Pt able to manipulate and clear with 0 clinical s/s aspiration and/or oropharyngeal dysphagia. Pt safe for regular solid, thin liquid diet, pt safe with straw use. No formal ST needs for dysphagia indicated at this time. SLP educated pt on role of speech therapist in current setting with re: speech/swallow; verbalized comprehension. SLP available for re-evaluation if indicated by MD. Results d/w RN. Thank you for this referral.   Gwen Lopez MA CCC-SLP     PLAN :  Recommendations and Planned Interventions:  No formal ST needs ID'd for dysphagia. Eval only. Discharge Recommendations: To Be Determined     SUBJECTIVE:   Patient stated I left my dentures at home.     OBJECTIVE:     Past Medical History:   Diagnosis Date    Amyloidosis of lung ([facility] [address] 2012    Cancer [facility])     prostate and thyroid    Chest pain, precordial     Dementia (HCC)     DJD (degenerative joint disease)     Hyperlipidemia     Hypertension     Stroke ([facility] [address]     Syncope     Thyroid disease     cancer- twice     Past Surgical History:   Procedure Laterality Date    HX CARPAL TUNNEL RELEASE      twice    HX HIP REPLACEMENT      two    HX ORTHOPAEDIC      bilateral hip surgery    HX PROSTATECTOMY      cancer    HX THORACOTOMY dx:  amyloidosis    SC REPAIR ROTATOR CUFF,CHRONIC      three times    THYROIDECTOMY      cancer     Home Situation: House       Diet prior to admission: reg/thin  Current Diet:  reg/thin     Cognitive and Communication Status:  Neurologic State: Alert  Orientation Level: Oriented X4  Cognition: Appropriate decision making,Follows commands,Recognition of people/places  Perception: Appears intact  Perseveration: No perseveration noted  Safety/Judgement: Fall prevention  Oral Assessment:  Oral Assessment  Labial: No impairment  Dentition: Edentulous  Oral Hygiene:  (adequate)  Lingual: No impairment  Mandible: No impairment  P.O. Trials:  Patient Position:  (90* in bed)  Vocal quality prior to P.O.: No impairment  Consistency Presented: Solid; Thin liquid  How Presented: Self-fed/presented;Straw     Bolus Acceptance: No impairment  Bolus Formation/Control: No impairment     Propulsion: No impairment  Oral Residue: None  Initiation of Swallow: No impairment  Laryngeal Elevation: Functional  Aspiration Signs/Symptoms: None  Pharyngeal Phase Characteristics: No impairment, issues, or problems   Effective Modifications: Alternate liquids/solids;Small sips and bites  Cues for Modifications: None       Oral Phase Severity: No impairment  Pharyngeal Phase Severity : No impairment    PAIN:  Pain level pre-treatment: 0/10   Pain level post-treatment: 0/10   Pain Intervention(s): Medication (see MAR); Rest, Ice, Repositioning   Response to intervention: Nurse notified, See doc flow    After evaluation:   []            Patient left in no apparent distress sitting up in chair  [x]            Patient left in no apparent distress in bed  [x]            Call bell left within reach  [x]            Nursing notified  []            Family present  []            Caregiver present  []            Bed alarm activated      COMMUNICATION/EDUCATION:   [x]            Aspiration precautions; swallow safety; compensatory techniques.   [x] Patient/family have participated as able in goal setting and plan of care. []            Patient/family agree to work toward stated goals and plan of care. []            Patient understands intent and goals of therapy; neutral about participation. []            Patient unable to participate in goal setting/plan of care; educ ongoing with interdisciplinary staff  []         Posted safety precautions in patient's room.     Thank you for this referral.  Lilia Mcfadden MA CCC-SLP  Time Calculation: 10 mins

## 2022-01-17 NOTE — PROGRESS NOTES
OCCUPATIONAL THERAPY EVALUATION    Problem: Self Care Deficits Care Plan (Adult)  Goal: *Acute Goals and Plan of Care (Insert Text)  Outcome: Progressing Towards Goal  Note:   FUNCTIONAL STATUS PRIOR TO ADMISSION: Patient was modified independent using a walker for functional mobility. HOME SUPPORT: The patient lived alone with wife to provide assistance. Initial Occupational Therapy Goals (1/17/2022) Within 7 day(s):  1. Patient will perform perform grooming standing sink level with supervision for 5 minutes for increased independence in ADLs. 2. Patient will perform UB dressing with I seated EOB for increased independence with ADLs. 3. Patient will perform LB dressing with mod I & A/E PRN for increased independence with ADLs. 4. Patient will perform all aspects of toileting with mod I for increased independence with ADLs. 5. Patient will perform LE ADLs utilizing body mechanics & adaptive strategies with 1 verbal cue for increased safety in ADLs. 6. Patient will independently apply energy conservation techniques with no verbal cue(s) for increased independence with ADLs. Patient: Abhijit Bill (44 y.o. male)  Date: 1/17/2022  Primary Diagnosis: CVA (cerebral vascular accident) [facility]) [I63.9]        Precautions:   Fall  PLOF: Pt reports independence in self care, reports was occasionally using a walker around mobile home. ASSESSMENT :  Based on the objective data described below, the patient presents with decreased independence in ADL tasks, decreased activity tolerance, decrease in fine motor skills using L UE. Pt presents supine in bed, agreeable to therapy, able to perform bed mobility with supervision. Demonstrates decreased active use of L UE, tends to use right although right handed. Able to don/doff socks and shoes, needs mod A typing laces with L UE.  Pt able to perform sit to stand without support, has some decreased balance and is impulsive with quick motion, needs cues to slow down, take smaller steps and to not spin when turning. Pt able to void urine while standing, holding grab bar with no LOB, able to perform hygiene at sink with no LOB. Returns to sitting. Educated pt on use of L UE in all daily tasks, eating, bathing, dressing. Pt agreeable to willing to particpate in therapy. Will need HH consult upon d/c.     Education: Pt educated on role of OT in acute setting, involvement of L UE in all activiites. Patient will benefit from skilled intervention to address the above impairments. Patient's rehabilitation potential is considered to be Good  Factors which may influence rehabilitation potential include:   []             None noted  [x]             Mental ability/status  [x]             Medical condition  [x]             Home/family situation and support systems  []             Safety awareness  [x]             Pain tolerance/management  []             Other:      PLAN :  Recommendations and Planned Interventions:   [x]               Self Care Training                  [x]      Therapeutic Activities  []               Functional Mobility Training   [x]      Cognitive Retraining  [x]               Therapeutic Exercises           [x]      Endurance Activities  [x]               Balance Training                    [x]      Neuromuscular Re-Education  [x]               Visual/Perceptual Training     [x]      Home Safety Training  [x]               Patient Education                   [x]      Family Training/Education  []               Other (comment):    Frequency/Duration: Patient will be followed by occupational therapy daily to address goals. Discharge Recommendations: Home Health  Further Equipment Recommendations for Discharge: TBD     SUBJECTIVE:   Patient stated I feel okay.     OBJECTIVE DATA SUMMARY:     Past Medical History:   Diagnosis Date    Amyloidosis of lung ([facility] [address] 2012    Cancer [facility])     prostate and thyroid    Chest pain, precordial     Dementia (HCC)     DJD (degenerative joint disease)     Hyperlipidemia     Hypertension     Stroke ([facility] [address]     Syncope     Thyroid disease     cancer- twice     Past Surgical History:   Procedure Laterality Date    HX CARPAL TUNNEL RELEASE      twice    HX HIP REPLACEMENT      two    HX ORTHOPAEDIC      bilateral hip surgery    HX PROSTATECTOMY      cancer    HX THORACOTOMY      dx:  amyloidosis    AL REPAIR ROTATOR CUFF,CHRONIC      three times    THYROIDECTOMY      cancer     Barriers to Learning/Limitations: None  Compensate with: visual, verbal, tactile, kinesthetic cues/model    Home Situation:   Home Situation  Home Environment: Trailer/mobile home  # Steps to Enter: 5  Rails to Enter: Yes  One/Two Story Residence: One story  Living Alone: No  Support Systems: Spouse/Significant Other  Patient Expects to be Discharged to[de-identified] Trailer/mobile home  Current DME Used/Available at Home: Walker, rolling  Tub or Shower Type: Tub/Shower combination  [x]  Right hand dominant   []  Left hand dominant    Cognitive/Behavioral Status:  Neurologic State: Alert; Appropriate for age  Orientation Level: Oriented X4  Cognition: Appropriate decision making; Impulsive  Safety/Judgement: Fall prevention    Skin: intact  Edema: none noted    Vision/Perceptual:    Tracking: Able to track stimulus in all quadrants w/o difficulty        Coordination: BUE  Coordination: Generally decreased, functional  Fine Motor Skills-Upper: Right Intact; Left Impaired    Gross Motor Skills-Upper: Left Intact; Right Intact    Balance:  Sitting: Intact  Standing: Intact; With support    Strength: BUE  Strength: Generally decreased, functional     Tone & Sensation: BUE  Tone: Normal  Sensation: Impaired     Range of Motion: BUE  AROM: Within functional limits    Functional Mobility and Transfers for ADLs:  Bed Mobility:     Supine to Sit: Supervision  Sit to Supine: Supervision  Scooting: Supervision  Transfers:  Sit to Stand: Contact guard assistance     Toilet Transfer : Contact guard assistance      Bathroom Mobility: Contact guard assistance    ADL Assessment:   Feeding: Setup    Oral Facial Hygiene/Grooming: Setup    Bathing: Minimum assistance    Upper Body Dressing: Setup    Lower Body Dressing: Contact guard assistance    Toileting: Contact guard assistance    ADL Intervention:       Lower Body Dressing Assistance  Dressing Assistance: Contact guard assistance  Socks: Contact guard assistance  Shoes with Elastic Laces: Contact guard assistance  Leg Crossed Method Used: Yes  Position Performed: Seated edge of bed    Toileting  Toileting Assistance: Contact guard assistance  Bladder Hygiene: Contact guard assistance  Clothing Management: Contact guard assistance  Adaptive Equipment: Grab bars    Cognitive Retraining  Problem Solving: General alternative solution; Identifying the problem  Executive Functions: Managing time;Regulating behavior  Safety/Judgement: Fall prevention      Pain:  Pain level pre-treatment: 0/10   Pain level post-treatment: 0/10   Pain Intervention(s): Medication provided by Nursing (see MAR); Rest, Ice, Repositioning   Response to intervention: Nurse notified, See doc flow sheet    Activity Tolerance:   Fair, pt impulsive, needs cues, however no rest breaks needed during tasks. Please refer to the flowsheet for vital signs taken during this treatment. After treatment:   [] Patient left in no apparent distress sitting up in chair  [x] Patient left in no apparent distress in bed  [x] Call bell left within reach  [x] Nursing notified  [] Caregiver present  [] Bed alarm activated    COMMUNICATION/EDUCATION:   [x] Role of Occupational Therapy in the acute care setting  [x] Home safety education was provided and the patient/caregiver indicated understanding. [x] Patient/family have participated as able in goal setting and plan of care. [x] Patient/family agree to work toward stated goals and plan of care.   [] Patient understands intent and goals of therapy, but is neutral about his/her participation. [] Patient is unable to participate in goal setting and plan of care. Thank you for this referral.  Delfino Weathers OTD, OTR/L   Time Calculation: 25 mins    Eval Complexity: History: MEDIUM Complexity : Expanded review of history including physical, cognitive and psychosocial  history ; Examination: MEDIUM Complexity : 3-5 performance deficits relating to physical, cognitive , or psychosocial skils that result in activity limitations and / or participation restrictions; Decision Making:MEDIUM Complexity : Patient may present with comorbidities that affect occupational performnce.  Miniml to moderate modification of tasks or assistance (eg, physical or verbal ) with assesment(s) is necessary to enable patient to complete evaluation

## 2022-01-17 NOTE — H&P
History & Physical    Patient: Jaime Welsh MRN: [id number]  CSN: [id number]    YOB: 1935  Age: 82 y.o. Sex: male      DOA: 1/16/2022    Chief Complaint:   Chief Complaint   Patient presents with    Numbness          HPI:     Jaime Welsh is a 82 y.o.  male who history of prostate colostomy hypertension presents to the emergency room with left-sided numbness and tingling that started in the face left arm and left leg patient's symptoms started on Friday and has not progressed with some dysarthria. Patient received a COVID booster Claudeen Fermo on Thursday 24 hours prior to onset of symptoms. Patient admits to not being compliant with his hypertensive and cholesterol medicine he has not been taking aspirin. He denies smoking or alcohol use but he does use marijuana and asked if I would prescribe it for him. Patient has been under stress as his wife has been recently hospitalized and just came home 2 days ago. In the emergency room CT showed indeterminate age stroke MRI shows definite stroke with results are pending at the time of this dictation. Patient was loaded with Plavix and aspirin and neurology was consulted. Past Medical History:   Diagnosis Date    Amyloidosis of lung ([address] 2012    Cancer [facility])     prostate and thyroid    Chest pain, precordial     Dementia ([address]     DJD (degenerative joint disease)     Hyperlipidemia     Hypertension     Stroke ([address]     Syncope     Thyroid disease     cancer- twice       Past Surgical History:   Procedure Laterality Date    HX CARPAL TUNNEL RELEASE      twice    HX HIP REPLACEMENT      two    HX ORTHOPAEDIC      bilateral hip surgery    HX PROSTATECTOMY      cancer    HX THORACOTOMY      dx:  amyloidosis    [place] REPAIR ROTATOR CUFF,CHRONIC      three times    THYROIDECTOMY      cancer       No family history on file.     Social History     Socioeconomic History    Marital status:    Tobacco Use    Smoking status: Never Smoker    Smokeless tobacco: Never Used   Substance and Sexual Activity    Alcohol use: No    Drug use: No       Prior to Admission medications    Medication Sig Start Date End Date Taking? Authorizing Provider   docusate sodium (COLACE) 100 mg capsule Take 100 mg by mouth two (2) times a day. 3/12/20   Provider, Historical   gabapentin (NEURONTIN) 300 mg capsule Take 300 mg by mouth two (2) times a day. 3/12/20   Provider, Historical   naproxen (NAPROSYN) 375 mg tablet  6/1/20   Provider, Historical   omega 3-dha-epa-fish oil 60- mg cap Take 500 mg by mouth. Provider, Historical   pravastatin (PRAVACHOL) 40 mg tablet  8/17/20   Provider, Historical   ascorbic acid, vitamin C, (VITAMIN C) 100 mg tab VITAMIN C TABS 8/10/16   Provider, Historical   ibuprofen (MOTRIN) 800 mg tablet  2/2/20   Provider, Historical   metoprolol tartrate (LOPRESSOR) 25 mg tablet Take 1 Tab by mouth. 4/16/18   Provider, Historical   traZODone (DESYREL) 150 mg tablet  1/13/20   Provider, Historical   co-enzyme Q-10 (CO Q-10) 100 mg capsule  6/28/19   Provider, Historical   lisinopril (PRINIVIL, ZESTRIL) 20 mg tablet Take  by mouth daily. Provider, Historical   multivitamin (ONE A DAY) tablet Take 1 Tab by mouth daily. Provider, Historical   aspirin 81 mg chewable tablet Take 81 mg by mouth daily. Provider, Historical   AMLODIPINE BESYLATE (NORVASC PO) Take  by mouth. Other, MD Carrington   levothyroxine (LEVOTHROID) 175 mcg tablet Take 175 mcg by mouth Daily (before breakfast). Mello, MD Carrington       Allergies   Allergen Reactions    Morphine Other (comments)     \"goes crazy\"         Review of Systems  GENERAL: Patient alert, awake and oriented times 3, able to communicate full sentences and not in distress. HEENT: No change in vision, no earache, tinnitus, sore throat or sinus congestion. NECK: No pain or stiffness. PULMONARY: No shortness of breath, cough or wheeze.    Cardiovascular: no pnd or orthopnea, no CP  GASTROINTESTINAL: No abdominal pain, nausea, vomiting or diarrhea, melena or bright red blood per rectum. GENITOURINARY: No urinary frequency, urgency, hesitancy or dysuria. MUSCULOSKELETAL: No joint or muscle pain, no back pain, no recent trauma. DERMATOLOGIC: No rash, no itching, no lesions. ENDOCRINE: No polyuria, polydipsia, no heat or cold intolerance. No recent change in weight. HEMATOLOGICAL: No anemia or easy bruising or bleeding. NEUROLOGIC: No headache, seizures+ numbness, +tingling + weakness. Left arm      Physical Exam:     Physical Exam:  Visit Vitals  BP (!) 164/82   Pulse 87   Temp 98.3 °F (36.8 °C)   Resp 18   SpO2 98%           Temp (24hrs), Av.3 °F (36.8 °C), Min:98.2 °F (36.8 °C), Max:98.3 °F (36.8 °C)    No intake/output data recorded. No intake/output data recorded. General:  Alert, cooperative, no distress, appears stated age. Head: Normocephalic, without obvious abnormality, atraumatic. Eyes:  Conjunctivae/corneas clear. PERRL, EOMs intact. Nose: Nares normal. No drainage or sinus tenderness. Neck: Supple, symmetrical, trachea midline, no adenopathy, thyroid: no enlargement, no carotid bruit and no JVD. Lungs:   Clear to auscultation bilaterally. Heart:  Regular rate and rhythm, S1, S2 normal.     Abdomen: Soft, non-tender. Bowel sounds normal.    Extremities: Extremities normal, atraumatic, no cyanosis or edema. Pulses: 2+ and symmetric all extremities. Skin:  No rashes or lesions   Neurologic: AAOx3, difficult with finger-to-nose with left hand difficult with rapid alternating hand movement left hand and right hand difficulty with heel-to-shin on the left and the right decreased sensation left greater than right decreased left hand  upper extremity strength lower extremity strength equal in bilateral       Labs Reviewed: All lab results for the last 24 hours reviewed.    and EKG    Procedures/imaging: see electronic medical records for all procedures/Xrays and details which were not copied into this note but were reviewed prior to creation of Plan    Recent Results (from the past 12 hour(s))   CBC WITH AUTOMATED DIFF    Collection Time: 01/17/22 12:09 AM   Result Value Ref Range    WBC 5.7 4.6 - 13.2 K/uL    RBC 5.06 4.35 - 5.65 M/uL    HGB 14.9 13.0 - 16.0 g/dL    HCT 44.6 36.0 - 48.0 %    MCV 88.1 78.0 - 100.0 FL    MCH 29.4 24.0 - 34.0 PG    MCHC 33.4 31.0 - 37.0 g/dL    RDW 14.6 (H) 11.6 - 14.5 %    PLATELET 879 363 - 620 K/uL    MPV 8.9 (L) 9.2 - 11.8 FL    NRBC 0.0 0  WBC    ABSOLUTE NRBC 0.00 0.00 - 0.01 K/uL    NEUTROPHILS 69 40 - 73 %    LYMPHOCYTES 18 (L) 21 - 52 %    MONOCYTES 10 3 - 10 %    EOSINOPHILS 2 0 - 5 %    BASOPHILS 1 0 - 2 %    IMMATURE GRANULOCYTES 0 0.0 - 0.5 %    ABS. NEUTROPHILS 3.9 1.8 - 8.0 K/UL    ABS. LYMPHOCYTES 1.0 0.9 - 3.6 K/UL    ABS. MONOCYTES 0.6 0.05 - 1.2 K/UL    ABS. EOSINOPHILS 0.1 0.0 - 0.4 K/UL    ABS. BASOPHILS 0.0 0.0 - 0.1 K/UL    ABS. IMM. GRANS. 0.0 0.00 - 0.04 K/UL    DF AUTOMATED     METABOLIC PANEL, COMPREHENSIVE    Collection Time: 01/17/22 12:09 AM   Result Value Ref Range    Sodium 141 136 - 145 mmol/L    Potassium 3.3 (L) 3.5 - 5.5 mmol/L    Chloride 106 100 - 111 mmol/L    CO2 26 21 - 32 mmol/L    Anion gap 9 3.0 - 18 mmol/L    Glucose 114 (H) 74 - 99 mg/dL    BUN 13 7.0 - 18 MG/DL    Creatinine 1.17 0.6 - 1.3 MG/DL    BUN/Creatinine ratio 11 (L) 12 - 20      GFR est AA >60 >60 ml/min/1.73m2    GFR est non-AA 59 (L) >60 ml/min/1.73m2    Calcium 9.1 8.5 - 10.1 MG/DL    Bilirubin, total 0.6 0.2 - 1.0 MG/DL    ALT (SGPT) 36 16 - 61 U/L    AST (SGOT) 44 (H) 10 - 38 U/L    Alk.  phosphatase 156 (H) 45 - 117 U/L    Protein, total 7.7 6.4 - 8.2 g/dL    Albumin 4.1 3.4 - 5.0 g/dL    Globulin 3.6 2.0 - 4.0 g/dL    A-G Ratio 1.1 0.8 - 1.7     TROPONIN-HIGH SENSITIVITY    Collection Time: 01/17/22 12:09 AM   Result Value Ref Range    Troponin-High Sensitivity 43 0 - 78 ng/L PROTHROMBIN TIME + INR    Collection Time: 01/17/22 12:09 AM   Result Value Ref Range    Prothrombin time 14.1 11.5 - 15.2 sec    INR 1.2 0.8 - 1.2       Assessment/Plan     Principal Problem:    1. CVA (cerebral vascular accident) ([facility] [address] (1/17/2022)     1. Aspirin 325mg po given then  Continue aspirin 81 mg and atorvastatin 80 mg  daily. Also started on Plavix load 300  2. telemetry monitoring while in-house. 3. Neuro checks per stroke protocol  4. Permissive hypertension (do not treat if BP is not >200/110, prefer prn labetolol 5mg)  5. IV normal saline continuous infusion 100-125 ml/h  6. Euglycemia with sliding scale insulin  7. Euthermia with acetaminophen 650mg Q6h prn for fever  8. We will avoid urinary cathethers   9. MRI without contrast of brain   10. MRA of head and neck or CTA  11. Echocardiogram with bubble study per protcol ordered as well as lipid panel and HBA1c   12. PT /OT and ST consults placed      2. Ostomy bag  Wound care consult    3. Amyloidosis of lung  Check xray  Albuterol prn    4.dementia  Check M02 folic acid  Sitter prn    5.  Thyroid Cancer  Restart synthroid    DVT/GI Prophylaxis:         Anya Kennedy MD  1/17/2022 6:32 AM

## 2022-01-18 ENCOUNTER — APPOINTMENT (OUTPATIENT)
Dept: NON INVASIVE DIAGNOSTICS | Age: 87
DRG: 064 | End: 2022-01-18
Attending: INTERNAL MEDICINE
Payer: MEDICARE

## 2022-01-18 LAB
CHOLEST SERPL-MCNC: 221 MG/DL
ECHO AO ROOT DIAM: 3 CM
ECHO AO ROOT INDEX: 1.66 CM/M2
ECHO AV AREA PEAK VELOCITY: 1.6 CM2
ECHO AV AREA PEAK VELOCITY: 1.8 CM2
ECHO AV AREA PEAK VELOCITY: 2 CM2
ECHO AV AREA VTI: 1.6 CM2
ECHO AV AREA VTI: 1.7 CM2
ECHO AV AREA VTI: 1.9 CM2
ECHO AV MEAN GRADIENT: 6 MMHG
ECHO AV MEAN VELOCITY: 1.2 M/S
ECHO AV PEAK GRADIENT: 13 MMHG
ECHO AV PEAK VELOCITY: 1.8 M/S
ECHO AV VELOCITY RATIO: 0.61
ECHO AV VTI: 36.4 CM
ECHO LA DIAMETER INDEX: 1.77 CM/M2
ECHO LA DIAMETER: 3.2 CM
ECHO LA TO AORTIC ROOT RATIO: 1.07
ECHO LA VOL 2C: 30 ML (ref 18–58)
ECHO LA VOL 4C: 20 ML (ref 18–58)
ECHO LA VOL BP: 25 ML (ref 18–58)
ECHO LA VOL BP: 25 ML (ref 18–58)
ECHO LA VOLUME AREA LENGTH: 27 ML
ECHO LA VOLUME INDEX A2C: 17 ML/M2 (ref 16–34)
ECHO LA VOLUME INDEX A4C: 11 ML/M2 (ref 16–34)
ECHO LA VOLUME INDEX AREA LENGTH: 15 ML/M2 (ref 16–34)
ECHO LV E' LATERAL VELOCITY: 8 CM/S
ECHO LV E' SEPTAL VELOCITY: 5 CM/S
ECHO LV EDV A2C: 60 ML
ECHO LV EDV A4C: 81 ML
ECHO LV EDV BP: 69 ML (ref 67–155)
ECHO LV EDV INDEX A4C: 45 ML/M2
ECHO LV EDV INDEX BP: 38 ML/M2
ECHO LV EDV NDEX A2C: 33 ML/M2
ECHO LV EJECTION FRACTION A2C: 60 %
ECHO LV EJECTION FRACTION A4C: 65 %
ECHO LV EJECTION FRACTION BIPLANE: 62 % (ref 55–100)
ECHO LV ESV A2C: 24 ML
ECHO LV ESV A4C: 28 ML
ECHO LV ESV BP: 26 ML (ref 22–58)
ECHO LV ESV INDEX A2C: 13 ML/M2
ECHO LV ESV INDEX A4C: 15 ML/M2
ECHO LV ESV INDEX BP: 14 ML/M2
ECHO LV FRACTIONAL SHORTENING: 30 % (ref 28–44)
ECHO LV GLOBAL LONGITUDINAL STRAIN (GLS): -15.2 %
ECHO LV INTERNAL DIMENSION DIASTOLE INDEX: 2.43 CM/M2
ECHO LV INTERNAL DIMENSION DIASTOLIC: 4.4 CM (ref 4.2–5.9)
ECHO LV INTERNAL DIMENSION SYSTOLIC INDEX: 1.71 CM/M2
ECHO LV INTERNAL DIMENSION SYSTOLIC: 3.1 CM
ECHO LV IVSD: 0.9 CM (ref 0.6–1)
ECHO LV MASS 2D: 128 G (ref 88–224)
ECHO LV MASS INDEX 2D: 70.7 G/M2 (ref 49–115)
ECHO LV POSTERIOR WALL DIASTOLIC: 0.9 CM (ref 0.6–1)
ECHO LV RELATIVE WALL THICKNESS RATIO: 0.41
ECHO LVOT AREA: 3.1 CM2
ECHO LVOT AV VTI INDEX: 0.56
ECHO LVOT DIAM: 2 CM
ECHO LVOT MEAN GRADIENT: 2 MMHG
ECHO LVOT PEAK GRADIENT: 4 MMHG
ECHO LVOT PEAK VELOCITY: 1.1 M/S
ECHO LVOT STROKE VOLUME INDEX: 35.4 ML/M2
ECHO LVOT SV: 64.1 ML
ECHO LVOT VTI: 20.4 CM
ECHO MV A VELOCITY: 0.88 M/S
ECHO MV E DECELERATION TIME (DT): 274.5 MS
ECHO MV E VELOCITY: 0.62 M/S
ECHO MV E/A RATIO: 0.7
ECHO MV E/E' LATERAL: 7.75
ECHO MV E/E' RATIO (AVERAGED): 10.08
ECHO MV E/E' SEPTAL: 12.4
ECHO RV FREE WALL PEAK S': 13 CM/S
ECHO RV INTERNAL DIMENSION: 2.5 CM
ECHO RV TAPSE: 2.5 CM (ref 1.5–2)
HDLC SERPL-MCNC: 58 MG/DL (ref 40–60)
HDLC SERPL: 3.8 {RATIO} (ref 0–5)
LDLC SERPL CALC-MCNC: 147.4 MG/DL (ref 0–100)
LIPID PROFILE,FLP: ABNORMAL
TRIGL SERPL-MCNC: 78 MG/DL (ref ?–150)
VLDLC SERPL CALC-MCNC: 15.6 MG/DL

## 2022-01-18 PROCEDURE — 97535 SELF CARE MNGMENT TRAINING: CPT

## 2022-01-18 PROCEDURE — 74011250636 HC RX REV CODE- 250/636: Performed by: INTERNAL MEDICINE

## 2022-01-18 PROCEDURE — 97530 THERAPEUTIC ACTIVITIES: CPT

## 2022-01-18 PROCEDURE — 74011250637 HC RX REV CODE- 250/637: Performed by: INTERNAL MEDICINE

## 2022-01-18 PROCEDURE — 93356 MYOCRD STRAIN IMG SPCKL TRCK: CPT

## 2022-01-18 PROCEDURE — 80061 LIPID PANEL: CPT

## 2022-01-18 PROCEDURE — 36415 COLL VENOUS BLD VENIPUNCTURE: CPT

## 2022-01-18 PROCEDURE — 74011000250 HC RX REV CODE- 250: Performed by: INTERNAL MEDICINE

## 2022-01-18 PROCEDURE — 65660000000 HC RM CCU STEPDOWN

## 2022-01-18 PROCEDURE — 74011250637 HC RX REV CODE- 250/637: Performed by: PSYCHIATRY & NEUROLOGY

## 2022-01-18 RX ORDER — SODIUM CHLORIDE 9 MG/ML
15 INJECTION INTRAMUSCULAR; INTRAVENOUS; SUBCUTANEOUS ONCE
Status: COMPLETED | OUTPATIENT
Start: 2022-01-18 | End: 2022-01-18

## 2022-01-18 RX ADMIN — HEPARIN SODIUM 5000 UNITS: 5000 INJECTION INTRAVENOUS; SUBCUTANEOUS at 23:37

## 2022-01-18 RX ADMIN — SODIUM CHLORIDE 125 ML/HR: 9 INJECTION, SOLUTION INTRAVENOUS at 02:51

## 2022-01-18 RX ADMIN — SODIUM CHLORIDE 125 ML/HR: 9 INJECTION, SOLUTION INTRAVENOUS at 11:17

## 2022-01-18 RX ADMIN — ALBUTEROL SULFATE 2 PUFF: 108 INHALANT RESPIRATORY (INHALATION) at 15:15

## 2022-01-18 RX ADMIN — ASPIRIN 81 MG: 81 TABLET, CHEWABLE ORAL at 09:11

## 2022-01-18 RX ADMIN — GABAPENTIN 300 MG: 300 CAPSULE ORAL at 22:01

## 2022-01-18 RX ADMIN — ALBUTEROL SULFATE 2 PUFF: 108 INHALANT RESPIRATORY (INHALATION) at 08:00

## 2022-01-18 RX ADMIN — GABAPENTIN 300 MG: 300 CAPSULE ORAL at 09:11

## 2022-01-18 RX ADMIN — HEPARIN SODIUM 5000 UNITS: 5000 INJECTION INTRAVENOUS; SUBCUTANEOUS at 07:03

## 2022-01-18 RX ADMIN — ATORVASTATIN CALCIUM 80 MG: 20 TABLET, FILM COATED ORAL at 22:00

## 2022-01-18 RX ADMIN — CLOPIDOGREL BISULFATE 75 MG: 75 TABLET ORAL at 09:11

## 2022-01-18 RX ADMIN — LEVOTHYROXINE SODIUM 175 MCG: 0.1 TABLET ORAL at 07:03

## 2022-01-18 RX ADMIN — ALBUTEROL SULFATE 2 PUFF: 108 INHALANT RESPIRATORY (INHALATION) at 20:00

## 2022-01-18 RX ADMIN — SODIUM CHLORIDE 15 ML: 9 INJECTION INTRAMUSCULAR; INTRAVENOUS; SUBCUTANEOUS at 13:01

## 2022-01-18 RX ADMIN — ALBUTEROL SULFATE 2 PUFF: 108 INHALANT RESPIRATORY (INHALATION) at 11:17

## 2022-01-18 RX ADMIN — TRAZODONE HYDROCHLORIDE 100 MG: 100 TABLET ORAL at 22:01

## 2022-01-18 RX ADMIN — HEPARIN SODIUM 5000 UNITS: 5000 INJECTION INTRAVENOUS; SUBCUTANEOUS at 15:14

## 2022-01-18 NOTE — ROUTINE PROCESS
Bedside and Verbal shift change report given to Angelito Lopez (oncoming nurse) by Harrison Leigh RN (offgoing nurse). Report included the following information SBAR, Kardex, MAR and Recent Results.

## 2022-01-18 NOTE — PROGRESS NOTES
NEUROLOGY PROGRESS NOTE        Patient: Clair Marroquin        Sex: male          DOA: 1/16/2022  YOB: 1935      Age:  82 y.o.         LOS: 1 day     Identification:  82 y.o. right-handed male with history of prostate cancer, amyloidosis, memory impairment, prior stroke, hypertension and hyperlipidemia, who presented with left-sided numbness. SUBJECTIVE:   The patient continues to have numbness on the left side. He denies any weakness. Stroke Work-up:  Brain MRI: 1. Patchy areas of acute infarction involving the right posterior cerebral artery distribution, as described above. 2.  Mild chronic small vessel changes with old left basal ganglia/corona radiata infarct. CT Head: Cerebral volume loss and mild bilateral periventricular and central white matter diminished attenuation which is nonspecific but likely to represent microvascular disease. Posterior lateral right thalamic/posterior right internal capsular small hypodensity possibly a lacunar infarct of uncertain acuity. Small right occipital hypodensity possibly an old infarct. CTA of head and neck: 1. Occlusion of the P2 segment of the right posterior cerebral artery, corresponding to the location of acute infarction on MRI. 2.  Atherosclerotic vascular disease with mild areas of stenosis in the neck and involving the petrocavernous junction of the right internal carotid artery. 3.  Postsurgical changes in the left upper lobe with atelectasis, scarring, and/or infiltrate. Dedicated imaging of the chest is suggested for further evaluation, as clinically warranted.  Stenosis Key: Mild-less than 25% Mild to moderate- 25-50% Moderate- 40-60% Moderate to severe- 50-75% Severe-greater than 75%  Echocardiogram:   Lipid panel:   Lab Results   Component Value Date/Time    Cholesterol, total 221 (H) 01/18/2022 02:47 AM    HDL Cholesterol 58 01/18/2022 02:47 AM    LDL, calculated 147.4 (H) 01/18/2022 02:47 AM    VLDL, calculated 15.6 01/18/2022 02:47 AM    Triglyceride 78 01/18/2022 02:47 AM    CHOL/HDL Ratio 3.8 01/18/2022 02:47 AM     HbA1c:   Lab Results   Component Value Date/Time    Hemoglobin A1c 5.9 (H) 01/17/2022 12:09 AM       REVIEW OF SYSTEMS: Denies chest pain, abdominal pain, nausea or vomiting. No fever or chills. OBJECTIVE:      Visit Vitals  BP (!) 160/98   Pulse 78   Temp 98.4 °F (36.9 °C)   Resp 16   Ht 5' 5\" (1.651 m)   Wt 73.9 kg (163 lb)   SpO2 98%   BMI 27.12 kg/m²     Physical Exam:  GEN: Alert, NAD  EYES: conjunctiva normal, lids with out lesions  HEENT: MMM. HEART: RRR +S1 +S2  LUNGS: CTA B/L no rales or rhonchi. ABDOMEN: Soft, non-tender. EXTREMITIES: No edema cyanosis  SKIN: no rashes or skin breakdown, no nodules  NEURO: Alert, oriented x3. Speech is fluent. Cranials: Face is symmetric. Smiles symmetrically. EOMI, VFF. Tongue is midline. Motor: Full strength at all sites. No pronator drift. Sensory: Decreased on the left side. Coordination: Intact with no dysmetria during FNF.      Current Facility-Administered Medications   Medication Dose Route Frequency    0.9% NaCl bacteriostatic (NORMAL SALINE) 0.9 % injection 15 mL  15 mL IntraVENous ONCE    0.9% sodium chloride infusion 25 mL  25 mL IntraVENous PRN    0.9% sodium chloride infusion  125 mL/hr IntraVENous CONTINUOUS    aspirin chewable tablet 81 mg  81 mg Oral DAILY    atorvastatin (LIPITOR) tablet 80 mg  80 mg Oral QHS    acetaminophen (TYLENOL) tablet 650 mg  650 mg Oral Q4H PRN    heparin (porcine) injection 5,000 Units  5,000 Units SubCUTAneous Q8H    albuterol (PROVENTIL HFA, VENTOLIN HFA, PROAIR HFA) inhaler 2 Puff  2 Puff Inhalation Q4H RT    traZODone (DESYREL) tablet 100 mg  100 mg Oral QHS    levothyroxine (SYNTHROID) tablet 175 mcg  175 mcg Oral ACB    gabapentin (NEURONTIN) capsule 300 mg  300 mg Oral BID    clopidogreL (PLAVIX) tablet 75 mg  75 mg Oral DAILY       Laboratory  Recent Results (from the past 24 hour(s))   LIPID PANEL Collection Time: 01/18/22  2:47 AM   Result Value Ref Range    LIPID PROFILE          Cholesterol, total 221 (H) <200 MG/DL    Triglyceride 78 <150 MG/DL    HDL Cholesterol 58 40 - 60 MG/DL    LDL, calculated 147.4 (H) 0 - 100 MG/DL    VLDL, calculated 15.6 MG/DL    CHOL/HDL Ratio 3.8 0 - 5.0         Radiology:  MRI BRAIN WO CONT    Result Date: 1/17/2022  EXAM: MRI brain without contrast 1/17/2022. CLINICAL INDICATION/HISTORY: 2 day history of left-sided numbness COMPARISON: CT scan of the head from 1/17/2022 TECHNIQUE: Multiplanar multisequential images of the brain were obtained without contrast. _______________ FINDINGS: BRAIN AND POSTERIOR FOSSA: There is an area of acute infarct extending along the inferior aspect of the right occipital lobe. Patchy involvement extends along the inferior margin of the right posterior temporal lobe, including the mesial temporal lobe and there is patchy posterior extension into the occipital lobe. There is an associated 1 cm area of acute infarct along the lateral aspect of the right thalamus. Mild atrophy and chronic small vessel changes are present in the periventricular and subcortical white matter of both cerebral hemispheres. There is a small area of old infarct along the posterior aspect of the left putamen and extending into the white matter along the posterior body of the left lateral ventricle. There is no intracranial hemorrhage. Mild areas of local mass effect are noted in association with the acute infarct in the right cerebral hemisphere. There are no significant additional areas of mass effect. There is no midline shift. There are no significant additional areas of abnormal parenchymal signal. No additional areas of true restricted diffusion or demonstrable. EXTRA-AXIAL SPACES AND MENINGES: There are no abnormal extra-axial fluid collections. Maxine Potter  VASCULAR: The visualized portions of the intracranial carotid and vertebrobasilar systems demonstrate patent appearing flow voids. CALVARIA: Unremarkable. SINUSES: Clear, as visualized. CRANIOCERVICAL JUNCTION: Within normal limits for age. OTHER: None. _______________     1. Patchy areas of acute infarction involving the right posterior cerebral artery distribution, as described above. 2.  Mild chronic small vessel changes with old left basal ganglia/corona radiata infarct. Initial interpretation was provided to the emergency room by [facility]. CT HEAD WO CONT    Result Date: 1/17/2022  EXAM: CT head INDICATION: Left-sided numbness for 48 hours. COMPARISON: November 29, 2013. TECHNIQUE: Axial CT imaging of the head was performed without intravenous contrast. Dose reduction techniques used: automated exposure control, adjustment of the mAs and/or kVp according to patient size, and iterative reconstruction techniques. Digital imaging and communications in Medicine (DICOM) format image data are available to nonaffiliated external healthcare facilities or entities on a secure, media free, reciprocally searchable basis with patient authorization for at least 12 months after this study. _______________ FINDINGS: BRAIN AND POSTERIOR FOSSA: There is cerebral volume loss with prominence of the lateral and the third ventricles. The cortical sulci are widened appropriately. The fourth ventricle and basal cisterns are normally outlined. There is mild bilateral periventricular and central white matter diminished attenuation. There is a small hypodensity lateral posterior right thalamus/posterior right internal capsule. There is a hypodensity right occipital lobe. There is no hemorrhage or midline shift. EXTRA-AXIAL SPACES AND MENINGES: There are no abnormal extra-axial fluid collections. CALVARIUM: Intact. SINUSES: Clear. OTHER: None. _______________     Cerebral volume loss and mild bilateral periventricular and central white matter diminished attenuation which is nonspecific but likely to represent microvascular disease.  Posterior lateral right thalamic/posterior right internal capsular small hypodensity possibly a lacunar infarct of uncertain acuity. Small right occipital hypodensity possibly an old infarct. CTA HEAD NECK W WO CONT    Result Date: 1/17/2022  CTA head and CTA neck with contrast 1/17/2022. CLINICAL INDICATION/HISTORY:   Left-sided paresthesias for 2 days. Stuttering speech. COMPARISON:   CT scan of the head from 1/17/2022. TECHNIQUE: Multiple axial CT images of the neck were obtained extending from the level of the aortic arch to the skull base during the dynamic infusion of 100 cc of Isovue-370 utilizing a CTA protocol. Multiple axial CT images of the head were obtained extending from below the level of the skull base to the vertex after the administration of the IV contrast utilizing a CTA protocol. Multiplanar reconstructions were obtained, including MIP reconstructions as well as curved planar reformats. Multiple additional 3-D surface rendering reformations were performed at a separate workstation. Dose reduction techniques:  Automated exposure control, mAs and/or kVp reductions based on patient size, and iterative reconstruction. The specific techniques utilized on this CT exam have been documented in the patient's electronic medical record. Digital imaging and communications and medicine (DICOM) format image data are available to nonaffiliated external healthcare facilities or entities on a secure, media free, reciprocally searchable basis with patient authorization for at least a 12 month period after this study. FINDINGS: GREAT VESSEL ORIGINS:   The origins of the great vessels from the aortic arch and the origin of the right common carotid artery from the innominate artery are patent. The great vessels are mildly tortuous suggesting the sequela of long-standing and/or uncontrolled hypertension. CERVICAL VASCULATURE:   Both common carotid arteries are patent throughout the thoracic and cervical segments.   A reverse bifurcation is incidentally noted on the left. There is eccentric plaque involving the origin of the right internal carotid artery. Mild stenosis is present, 0% by NASCET criteria based on the endoluminal reconstructions. There is also minimal irregularity of the left internal carotid artery origin, also with 0% stenosis by NASCET criteria. The cervical portions of both internal carotid arteries are otherwise patent. Left vertebral artery dominance is noted. Vertebral artery origins are patent. The right vertebral artery is partially obscured due to beam hardening artifacts related to dense contrast material within the adjacent veins but the vessel is grossly patent along its T1 segment. Both vertebral arteries are otherwise well visualized and patent throughout their cervical segments. INTRACRANIAL VASCULATURE:   Focal calcification is noted at the petrocavernous junction of the right internal carotid artery with mild stenosis. The carotid siphons are otherwise patent. The vertebrobasilar system is patent. There is abrupt occlusion of the P2 segment of the right posterior cerebral artery, corresponding to the distribution of acute infarction on the subsequently obtained MRI. There is no hemodynamically significant stenosis or occlusion involving the remainder of the main intracranial vessels. There is no evidence of aneurysm formation or underlying vascular malformation. OTHER:   Postsurgical changes are noted in the left upper lobe with left suprahilar atelectasis and/or scar. Underlying infiltrate cannot be excluded and the pulmonary processes are incompletely evaluated on this examination tailored for assessment of the cervical and intracranial vasculature. 1.  Occlusion of the P2 segment of the right posterior cerebral artery, corresponding to the location of acute infarction on MRI.  2.  Atherosclerotic vascular disease with mild areas of stenosis in the neck and involving the petrocavernous junction of the right internal carotid artery. 3.  Postsurgical changes in the left upper lobe with atelectasis, scarring, and/or infiltrate. Dedicated imaging of the chest is suggested for further evaluation, as clinically warranted. Stenosis Key: Mild-less than 25% Mild to moderate- 25-50% Moderate- 40-60% Moderate to severe- 50-75% Severe-greater than 75%     XR CHEST PORT    Result Date: 1/17/2022  CLINICAL HISTORY:  As above. COMPARISONS:  Chest x-ray 11/26/2015. Chest CT 11/26/2015 TECHNIQUE:  single frontal view of the chest ------------------------------------------ FINDINGS: Lungs:  Bandlike opacity in the left upper lobe with slight distortion of the left hilum, not appreciably changed and likely scar. No evidence of consolidation or definite acute lung process. No significant pleural fluid. Mediastinum: Unremarkable. Bones: No evidence of fracture or suspicious bone lesion. ------------------------------------------    No acute cardiopulmonary process. ASSESSMENT/IMPRESSION:   Right PCA infarct in the setting of right PCA occlusion. The patient was not a candidate for tPA or thrombectomy due to time of onset of symptoms. I am not sure if coronavirus vaccine and any bearing on the current clinical picture.       RECOMMENDATIONS:  1. Continue aspirin 81 mg and Plavix 75 mg daily. 2. Continue atorvastatin 80 mg daily. 3. Continue telemetry monitoring while in-house. 4.  Neuro checks per stroke protocol  5. Normotensive protocol. 6. Oral liquids for hydration. 7. Echocardiogram with bubble study  8. SCDs and DVT prophylaxis   9. PT/OT and disposition planning. If echocardiogram is normal, the patient can be discharged home. I will follow the patient as outpatient. Please do not hesitate to return with any questions.     Signed:  Evita Mosqueda MD  1/18/2022  11:56 AM

## 2022-01-18 NOTE — PROGRESS NOTES
Hospitalist Progress Note    Patient: Eliana Richards MRN: [id number]  CSN: [id number]    YOB: 1935  Age: 82 y.o. Sex: male    DOA: 1/16/2022 LOS:  LOS: 1 day          Chief Complaint:    stroke      Assessment/Plan   Eliana Richards is a 82 y.o. right-handed male with history of prostate cancer, amyloidosis, memory impairment, prior stroke, hypertension and hyperlipidemia, who presented with left-sided numbness after booster vaccine for coronavirus. Right PCA infarct in the setting of right PCA occlusion: neurology following, echo pending, persmiisive htn, asa and plavix, followed by PT/OT/ST    Ostomy bag: general nursing care      Amyloidosis of lung: Check xray. Albuterol prn     Dementia: supportive care     thyroid Cancer: synthroid    Disposition : Home in 1-2 dayts  Patient Active Problem List   Diagnosis Code    Stroke ([facility] [address] I63.9    Amyloidosis of lung ([facility] [address] E85.4, J99    Hypertension I10    Syncope R55    DJD (degenerative joint disease) M19.90    Chest pain, precordial R07.2    Hyperlipidemia E78.5    Other and unspecified noninfectious gastroenteritis and colitis(558.9) K52.9    Abdominal pain R10.9    Nausea & vomiting R11.2    Prostate Cancer, r[facility]ppy [facility] before 2010 C61    Nocturia R35.1    CVA (cerebral vascular accident) ([facility] [address] I63.9       Subjective:    Very nervous because wife was hospitalized for 2.5 weeks.  Not caring for himself and takling his bp meds, etc.   Wants me to check on his ostomy bag bec ause he thinks it needs changing     Review of systems:    Constitutional: denies fevers, chills, myalgias  Respiratory: denies SOB, cough  Cardiovascular: denies chest pain, palpitations  Gastrointestinal: denies nausea, vomiting, diarrhea      Vital signs/Intake and Output:  Visit Vitals  BP (!) 160/98   Pulse 78   Temp 98.4 °F (36.9 °C)   Resp 16   Ht 5' 5\" (1.651 m)   Wt 73.9 kg (163 lb)   SpO2 98%   BMI 27.12 kg/m²     Current Shift:  No intake/output data recorded. Last three shifts:  01/16 1901 - 01/18 0700  In: -   Out: 550 [Urine:550]    Exam:    General: Well developed, alert, NAD, OX3  Head/Neck: NCAT, supple, No masses, No lymphadenopathy  CVS:Regular rate and rhythm, no M/R/G, S1/S2 heard, no thrill  Lungs:Clear to auscultation bilaterally, no wheezes, rhonchi, or rales  Abdomen: Soft, Nontender, No distention, Normal Bowel sounds, No hepatomegaly  Extremities: No C/C/E, pulses palpable 2+  Skin:normal texture and turgor, no rashes, no lesions  Neuro:grossly normal , follows commands  Psych:appropriate                Labs: Results:       Chemistry Recent Labs     01/17/22 0009   *      K 3.3*      CO2 26   BUN 13   CREA 1.17   CA 9.1   AGAP 9   BUCR 11*   *   TP 7.7   ALB 4.1   GLOB 3.6   AGRAT 1.1      CBC w/Diff Recent Labs     01/17/22 0009   WBC 5.7   RBC 5.06   HGB 14.9   HCT 44.6      GRANS 69   LYMPH 18*   EOS 2      Cardiac Enzymes No results for input(s): CPK, CKND1, ROXANA in the last 72 hours. No lab exists for component: CKRMB, TROIP   Coagulation Recent Labs     01/17/22 0009   PTP 14.1   INR 1.2       Lipid Panel Lab Results   Component Value Date/Time    Cholesterol, total 221 (H) 01/18/2022 02:47 AM    HDL Cholesterol 58 01/18/2022 02:47 AM    LDL, calculated 147.4 (H) 01/18/2022 02:47 AM    VLDL, calculated 15.6 01/18/2022 02:47 AM    Triglyceride 78 01/18/2022 02:47 AM    CHOL/HDL Ratio 3.8 01/18/2022 02:47 AM      BNP No results for input(s): BNPP in the last 72 hours.    Liver Enzymes Recent Labs     01/17/22 0009   TP 7.7   ALB 4.1   *      Thyroid Studies No results found for: T4, T3U, TSH, TSHEXT     Procedures/imaging: see electronic medical records for all procedures/Xrays and details which were not copied into this note but were reviewed prior to creation of Don Mejia MD

## 2022-01-18 NOTE — PROGRESS NOTES
Bed side report given to Sarah Araujo (oncoming nurse) by Kaylin Barajas RN (offgoing nurse). Report includes Kardex, MAR, Lab result, resent test, and Tele rhythm.

## 2022-01-18 NOTE — PROGRESS NOTES
Problem: Patient Education: Go to Patient Education Activity  Goal: Patient/Family Education  Outcome: Progressing Towards Goal     Problem: Falls - Risk of  Goal: *Absence of Falls  Description: Document Sigala Blades Fall Risk and appropriate interventions in the flowsheet.   Outcome: Progressing Towards Goal  Note: Fall Risk Interventions:  Mobility Interventions: Patient to call before getting OOB         Medication Interventions: Patient to call before getting OOB         History of Falls Interventions: Room close to nurse's station         Problem: Patient Education: Go to Patient Education Activity  Goal: Patient/Family Education  Outcome: Progressing Towards Goal     Problem: Patient Education: Go to Patient Education Activity  Goal: Patient/Family Education  Outcome: Progressing Towards Goal     Problem: Patient Education: Go to Patient Education Activity  Goal: Patient/Family Education  Outcome: Progressing Towards Goal

## 2022-01-19 VITALS
WEIGHT: 163 LBS | HEIGHT: 65 IN | SYSTOLIC BLOOD PRESSURE: 168 MMHG | OXYGEN SATURATION: 100 % | RESPIRATION RATE: 18 BRPM | TEMPERATURE: 98.4 F | BODY MASS INDEX: 27.16 KG/M2 | DIASTOLIC BLOOD PRESSURE: 70 MMHG | HEART RATE: 69 BPM

## 2022-01-19 PROCEDURE — 97116 GAIT TRAINING THERAPY: CPT

## 2022-01-19 PROCEDURE — 74011250637 HC RX REV CODE- 250/637: Performed by: INTERNAL MEDICINE

## 2022-01-19 PROCEDURE — 74011250636 HC RX REV CODE- 250/636: Performed by: INTERNAL MEDICINE

## 2022-01-19 PROCEDURE — 97530 THERAPEUTIC ACTIVITIES: CPT

## 2022-01-19 PROCEDURE — 74011250637 HC RX REV CODE- 250/637: Performed by: PSYCHIATRY & NEUROLOGY

## 2022-01-19 PROCEDURE — 77030037255 HC PCH OST FLX SENSURA COLO -A

## 2022-01-19 RX ORDER — CLOPIDOGREL BISULFATE 75 MG/1
75 TABLET ORAL DAILY
Qty: 30 TABLET | Refills: 2 | Status: SHIPPED | OUTPATIENT
Start: 2022-01-20

## 2022-01-19 RX ORDER — ATORVASTATIN CALCIUM 80 MG/1
80 TABLET, FILM COATED ORAL
Qty: 30 TABLET | Refills: 2 | Status: SHIPPED | OUTPATIENT
Start: 2022-01-19

## 2022-01-19 RX ADMIN — GABAPENTIN 300 MG: 300 CAPSULE ORAL at 11:17

## 2022-01-19 RX ADMIN — CLOPIDOGREL BISULFATE 75 MG: 75 TABLET ORAL at 11:17

## 2022-01-19 RX ADMIN — ALBUTEROL SULFATE 2 PUFF: 108 INHALANT RESPIRATORY (INHALATION) at 04:00

## 2022-01-19 RX ADMIN — HEPARIN SODIUM 5000 UNITS: 5000 INJECTION INTRAVENOUS; SUBCUTANEOUS at 11:17

## 2022-01-19 RX ADMIN — ALBUTEROL SULFATE 2 PUFF: 108 INHALANT RESPIRATORY (INHALATION) at 00:00

## 2022-01-19 RX ADMIN — ASPIRIN 81 MG: 81 TABLET, CHEWABLE ORAL at 11:17

## 2022-01-19 RX ADMIN — LEVOTHYROXINE SODIUM 175 MCG: 0.1 TABLET ORAL at 11:17

## 2022-01-19 NOTE — PROGRESS NOTES
Occupational Therapy Treatment Attempt     Chart reviewed. Attempted Occupational Therapy Treatment, however, patient unable to be seen due to:  []  Nausea/vomiting  []  Eating  []  Pain  []  Patient too lethargic  []  Off Unit for testing/procedure  []  Dialysis treatment in progress  []  Telemetry Results  [x]  Other: Pt sleeping. Will f/u later as patient's schedule allows.   FAREED Blandon

## 2022-01-19 NOTE — PROGRESS NOTES
Problem: Patient Education: Go to Patient Education Activity  Goal: Patient/Family Education  Outcome: Progressing Towards Goal     Problem: Falls - Risk of  Goal: *Absence of Falls  Description: Document Sofía Hoffman Fall Risk and appropriate interventions in the flowsheet.   Outcome: Progressing Towards Goal  Note: Fall Risk Interventions:  Mobility Interventions: Patient to call before getting OOB         Medication Interventions: Bed/chair exit alarm,Patient to call before getting OOB         History of Falls Interventions: Bed/chair exit alarm

## 2022-01-19 NOTE — PROGRESS NOTES
conducted an initial consultation and Spiritual Assessment for Dorothy Preston, who is a 82 y.o.,male. Patients Primary Language is: [place]. According to the patients EMR Yazidism Affiliation is: Chapito Miles. The reason the Patient came to the hospital is:   Patient Active Problem List    Diagnosis Date Noted    Prostate Cancer, r[facility]ppy [facility] before 2010 02/06/2020    Nocturia 02/10/2020    CVA (cerebral vascular accident) ([facility] [address] 01/17/2022    Other and unspecified noninfectious gastroenteritis and colitis(558.9) 11/12/2012    Abdominal pain 11/12/2012    Nausea & vomiting 11/12/2012    Hypertension     Syncope     DJD (degenerative joint disease)     Chest pain, precordial     Hyperlipidemia     Stroke [facility])     Amyloidosis of lung ([facility] [address]         The  provided the following Interventions:  Initiated a relationship of care and support. Explored issues of jimmy, belief, spirituality and Sikh/ritual needs while hospitalized. Listened empathically. Provided chaplaincy education. Provided information about Spiritual Care Services. Offered prayer and assurance of continued prayers on patient's behalf. Chart reviewed. The following outcomes where achieved:  Patient shared limited information about both their medical narrative and spiritual journey/beliefs.  confirmed Patient's Yazidism Affiliation. Patient processed feeling about current hospitalization. Patient expressed gratitude for 's visit. Assessment:  Patient does not have any Sikh/cultural needs that will affect patients preferences in health care. There are no spiritual or Sikh issues which require intervention at this time. Plan:  Chaplains will continue to follow and will provide pastoral care on an as needed/requested basis.  recommends bedside caregivers page  on duty if patient shows signs of acute spiritual or emotional distress.      Staff La Hernandez Angle Benitez  Board Certified [address]  THE [facility]: [phone]  [facility]: [phone]

## 2022-01-19 NOTE — PROGRESS NOTES
Problem: Mobility Impaired (Adult and Pediatric)  Goal: *Acute Goals and Plan of Care (Insert Text)  Description: Physical Therapy Goals   Initiated 1/17/2022 and to be accomplished within 5-7 day(s)  1. Patient will move from supine <> sit with mod I in prep for out of bed activity and change of position. 2.  Patient will perform sit<> stand with mod I with LRAD in prep for transfers/ambulation. 3.  Patient will transfer from bed <> chair with mod I with LRAD for time up in chair for completion of ADL activity. 4.  Patient will ambulate 100 feet with mod I/LRAD for improved functional mobility at discharge. 5.  Patient will ascend/descend 3-5 stairs with handrail(s) with contact guard assist/S for home re-entry as needed. Outcome: Progressing Towards Goal  physical Therapy TREATMENT    Patient: Jocelyne Lui (53 y.o. male)  Date: 1/19/2022  Diagnosis: CVA (cerebral vascular accident) [facility]) [I63.9] CVA (cerebral vascular accident) [facility])       Precautions: Fall   Chart, physical therapy assessment, plan of care and goals were reviewed. ASSESSMENT:  Pt found in good spirits , reporting that he will be D/c'd today. Pt with continued complaint of shoulder pain and note that it will be assessed prior to D/c. Pt is very mobile with RW and GB. Pt has some difficulty with walker spacing. No physical assist required as pt ambulates and practices stair mobility. Close supervision is suggested at d/c. Progression toward goals:  []      Improving appropriately and progressing toward goals  [x]      Improving slowly and progressing toward goals  []      Not making progress toward goals and plan of care will be adjusted     PLAN:  Patient continues to benefit from skilled intervention to address the above impairments. Continue treatment per established plan of care.   Discharge Recommendations:  Home Health with supervision   Further Equipment Recommendations for Discharge:  gait belt and rolling walker SUBJECTIVE:   Patient stated I'm wait for a doctor to look at my shoulder.     OBJECTIVE DATA SUMMARY:   Critical Behavior:  Neurologic State: Alert  Orientation Level: Disoriented X4,Oriented X4  Cognition: Appropriate decision making  Safety/Judgement: Fall prevention  Functional Mobility Training:  Bed Mobility:  Rolling: Modified independent  Supine to Sit: Modified independent  Sit to Supine: Supervision  Scooting: Supervision  Transfers:  Sit to Stand: Supervision  Stand to Sit: Stand-by assistance  Balance:  Sitting: Intact  Standing: Intact; With support  Standing - Static: Good  Standing - Dynamic : Fair  Ambulation/Gait Training:  Distance (ft): 350 Feet (ft)  Assistive Device: Gait belt;Walker, rolling  Ambulation - Level of Assistance: Stand-by assistance;Supervision  Gait Abnormalities: Decreased step clearance  Base of Support: Narrowed  Speed/Kristel: Slow  Step Length: Right shortened;Left shortened  Stairs:  Number of Stairs Trained: 6 (6\" box )  Stairs - Level of Assistance: Contact guard assistance  Rail Use: Right   Pain:  Pain Scale 1: Numeric (0 - 10)  Pain Intensity 1: 0  Pain out: 4 (Headache)  Activity Tolerance:   Good  Please refer to the flowsheet for vital signs taken during this treatment.   After treatment:   [] Patient left in no apparent distress sitting up in chair  [x] Patient left in no apparent distress in bed  [x] Call bell left within reach  [x] Nursing notified  [] Caregiver present  [] Bed alarm activated      Gennaro Tillman PTA   Time Calculation: 31 mins

## 2022-01-19 NOTE — PROGRESS NOTES
Problem: Patient Education: Go to Patient Education Activity  Goal: Patient/Family Education  Outcome: Progressing Towards Goal     Problem: Falls - Risk of  Goal: *Absence of Falls  Description: Document Jena Schmittne Fall Risk and appropriate interventions in the flowsheet.   Outcome: Progressing Towards Goal  Note: Fall Risk Interventions:  Mobility Interventions: Patient to call before getting OOB         Medication Interventions: Bed/chair exit alarm         History of Falls Interventions: Bed/chair exit alarm         Problem: Patient Education: Go to Patient Education Activity  Goal: Patient/Family Education  Outcome: Progressing Towards Goal     Problem: Patient Education: Go to Patient Education Activity  Goal: Patient/Family Education  Outcome: Progressing Towards Goal     Problem: Patient Education: Go to Patient Education Activity  Goal: Patient/Family Education  Outcome: Progressing Towards Goal     Problem: TIA/CVA Stroke: 0-24 hours  Goal: Minimize risk of bleeding post-thrombolytic infusion  Outcome: Progressing Towards Goal  Goal: Monitor for complications post-thrombolytic infusion  Outcome: Progressing Towards Goal     Problem: Patient Education: Go to Patient Education Activity  Goal: Patient/Family Education  Outcome: Progressing Towards Goal

## 2022-01-19 NOTE — PROGRESS NOTES
Problem: Mobility Impaired (Adult and Pediatric)  Goal: *Acute Goals and Plan of Care (Insert Text)  Description: Physical Therapy Goals   Initiated 1/17/2022 and to be accomplished within 5-7 day(s)  1. Patient will move from supine <> sit with mod I in prep for out of bed activity and change of position. 2.  Patient will perform sit<> stand with mod I with LRAD in prep for transfers/ambulation. 3.  Patient will transfer from bed <> chair with mod I with LRAD for time up in chair for completion of ADL activity. 4.  Patient will ambulate 100 feet with mod I/LRAD for improved functional mobility at discharge. 5.  Patient will ascend/descend 3-5 stairs with handrail(s) with contact guard assist/S for home re-entry as needed. Outcome: Progressing Towards Goal  physical Therapy TREATMENT    Patient: Jaime Mccrary (8 y.o. male)  Date: 1/18/2022  (late entry)  Diagnosis: CVA (cerebral vascular accident) [facility]) [I63.9] CVA (cerebral vascular accident) [facility])  Precautions: Fall  Chart, physical therapy assessment, plan of care and goals were reviewed. ASSESSMENT:  Ot found supine in bed on arrival.  Upon transition to sit EOB noted pt ostomy bag leaking stool, with gown and pt clothing soiled. Nurse Apriln Corporal notified and in to change bag with CNS Deirdre assist and performance. Pt seen with OT and completed self care activity, requiring CGA/SBA during transfers and bathing self in standing at bedside. Standing balance improved. Pt ostomy bag again slipping out of place. Re-adhered with tape to edges and CNS Deirdre notified of same. Pt too fatigued for participation with GT activity post above. Left up in chair with all needs in reach and reminded to call and wait for staff assist before returning to bed. Pt expressed understanding. Will continue per POC.   Progression toward goals:  [x]      Improving appropriately and progressing toward goals  []      Improving slowly and progressing toward goals  [] Not making progress toward goals and plan of care will be adjusted     PLAN:  Patient continues to benefit from skilled intervention to address the above impairments. Continue treatment per established plan of care. Discharge Recommendations:  Home Physical Therapy  Further Equipment Recommendations for Discharge:  rolling walker     SUBJECTIVE:   Patient stated I'm doing alright.     OBJECTIVE DATA SUMMARY:   Critical Behavior:  Neurologic State: Alert  Orientation Level: Oriented X4  Cognition: Appropriate decision making,Appropriate for age attention/concentration,Appropriate safety awareness  Safety/Judgement: Fall prevention  Functional Mobility Training:  Bed Mobility:  Supine to Sit: Supervision  Sit to Supine: Supervision  Scooting: Supervision  Transfers:  Sit to Stand: Contact guard assistance,Stand-by assistance  Stand to Sit: Stand-by assistance  Bed to Chair: Contact guard assistance,Stand-by assistance  Balance:  Sitting: Intact  Standing: Intact,With support  Standing - Static: Good  Standing - Dynamic : Fair  Pain:  Pain Scale 1: Numeric (0 - 10)  Pain Intensity 1: 0  Activity Tolerance:   Fair   Please refer to the flowsheet for vital signs taken during this treatment.   After treatment:   [] Patient left in no apparent distress sitting up in chair  [] Patient left in no apparent distress in bed  [] Call bell left within reach  [] Nursing notified  [] Caregiver present  [] Bed alarm activated      Luis Haddad, PT   Time Calculation: 40 mins

## 2022-01-19 NOTE — PROGRESS NOTES
Problem: Self Care Deficits Care Plan (Adult)  Goal: *Acute Goals and Plan of Care (Insert Text)  Description: Initial Occupational Therapy Goals (1/17/2022) Within 7 day(s):  1. Patient will perform perform grooming standing sink level with supervision for 5 minutes for increased independence in ADLs. 2. Patient will perform UB dressing with I seated EOB for increased independence with ADLs. 3. Patient will perform LB dressing with mod I & A/E PRN for increased independence with ADLs. 4. Patient will perform all aspects of toileting with mod I for increased independence with ADLs. 5. Patient will perform LE ADLs utilizing body mechanics & adaptive strategies with 1 verbal cue for increased safety in ADLs. 6. Patient will independently apply energy conservation techniques with no verbal cue(s) for increased independence with ADLs. 1/18/2022 2005 by Alexa Cash OT  Outcome: Progressing Towards Goal    OCCUPATIONAL THERAPY TREATMENT    Patient: Irma Martínez (83 y.o. male)  Date: 1/18/2022  Diagnosis: CVA (cerebral vascular accident) [facility]) [I63.9] CVA (cerebral vascular accident) [facility])       Precautions: Fall  PLOF:     Chart, occupational therapy assessment, plan of care, and goals were reviewed. ASSESSMENT:  Pt presented supine in bed and agrees to participate with ADLs and functional transfers during this session. Pt performed bed mobility, supine<>sit, sit<>stand transfers, bathing seated EOB, UB and LB dressing. Noted pt's colostomy bag leaking, nursing notified and ostomy site was cleaned and replaced. Pt was left seated in chair at the end of session in NAD. Progression toward goals:  [x]          Improving appropriately and progressing toward goals  []          Improving slowly and progressing toward goals  []          Not making progress toward goals and plan of care will be adjusted     PLAN:  Patient continues to benefit from skilled intervention to address the above impairments. Continue treatment per established plan of care. Discharge Recommendations:  Rehab and Home Physical Therapy  Further Equipment Recommendations for Discharge:  N/A     SUBJECTIVE:   Patient stated  I am doing alright.     OBJECTIVE DATA SUMMARY:   Cognitive/Behavioral Status:  Neurologic State: Alert  Orientation Level: Oriented X4  Cognition: Appropriate for age attention/concentration,Follows commands  Safety/Judgement: Fall prevention    Functional Mobility and Transfers for ADLs:   Bed Mobility:  Supine to Sit: Supervision  Sit to Supine: Supervision  Scooting: Supervision   Transfers:  Sit to Stand: Contact guard assistance;Stand-by assistance  Stand to Sit: Stand-by assistance  Bed to Chair: Contact guard assistance;Stand-by assistance  Balance:  Sitting: Intact  Standing: Intact; With support  Standing - Static: Good  Standing - Dynamic : Fair  ADL Intervention:  Upper Body Bathing  Bathing Assistance: Supervision  Position Performed: Seated edge of bed    Lower Body Bathing  Bathing Assistance: Contact guard assistance  Perineal  : Contact guard assistance  Position Performed: Standing  Lower Body : Contact guard assistance;Stand-by assistance  Position Performed: Seated edge of bed    Upper Body Dressing Assistance  Dressing Assistance: Linh Brar [address]: Supervision    Lower Body Dressing Assistance  Dressing Assistance: Contact guard assistance;Stand-by assistance  Protective Undergarmet: Contact guard assistance;Stand-by assistance  Socks: Stand-by assistance;Contact guard assistance  Leg Crossed Method Used: Yes  Position Performed: Seated edge of bed  Cues: Don    Cognitive Retraining  Safety/Judgement: Fall prevention  Pain:  Pain level pre-treatment: 0/10   Pain level post-treatment: 0/10  Pain Intervention(s): Medication (see MAR);  Rest, Ice, Repositioning   Response to intervention: Nurse notified, See doc flow    Activity Tolerance:    Good     Please refer to the flowsheet for vital signs taken during this treatment. After treatment:   [x]  Patient left in no apparent distress sitting up in chair  []  Patient left in no apparent distress in bed  [x]  Call bell left within reach  [x]  Nursing notified  []  Caregiver present  []  Bed alarm activated    COMMUNICATION/EDUCATION:   [x] Role of Occupational Therapy in the acute care setting  [x] Home safety education was provided and the patient/caregiver indicated understanding. [x] Patient/family have participated as able in working towards goals and plan of care. [x] Patient/family agree to work toward stated goals and plan of care. [] Patient understands intent and goals of therapy, but is neutral about his/her participation. [] Patient is unable to participate in goal setting and plan of care.       Thank you for this referral.  Marciano Parrish OTR/L  Time Calculation: 40 mins

## 2022-01-19 NOTE — DISCHARGE SUMMARY
[address]    Name:  Makayla Tariq  MR#:   [id number]  :  1935  ACCOUNT #:  [de-identified]  ADMIT DATE:  2022  DISCHARGE DATE:  2022    DISCHARGE DIAGNOSIS:  Acute stroke. HOSPITAL SUMMARY:  The patient is a delightful 45-year-old elderly gentleman who comes from home. He came into the hospital on the aforementioned date with left-sided numbness and tingling that started in his face, left arm, and left leg. He has had some intermittent dysarthria. He did receive a COVID booster 24 hours prior to the onset of symptoms. He is not a smoker. He does use marijuana. He has been under some stress recently as his wife was just hospitalized. CT showed an indeterminate-aged stroke. MRI showed definitive stroke. The patient was given aspirin and Plavix and admitted. He was been seen by Neurology. The patient's echocardiogram shows an EF of 60-65%. No shunt was seen. Bubble study was negative. Total cholesterol is 221. He has been changed from Pravachol to Lipitor. 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 H and H is 14.9 and 44.6, platelets are 021. Chemistry on the  was unremarkable except slightly low potassium at 3.3. Hemoglobin A1c is normal at 5.9%. The location of the stroke is in the right posterior cerebral artery distribution. The patient has done well, participating with PT and OT here. Physical Therapy today cleared him for home health physical therapy in a rolling walker. His preference is to go home. The patient has been cleared by Neurology. They recommended that he is stable for going home. Continue aspirin and Plavix as well as Lipitor 80 mg daily. Oral liquids for hydration. Today's exam shows an elderly  gentleman in no acute distress.   He is awake, alert, oriented to person and place and time. Lungs are clear bilaterally. Speech is within normal range. Blood pressure is 168/70, pulse 69, temperature 98.4, respiratory rate 18, SaO2 100% on room air. Upper extremity strength appears equal bilaterally. Abdomen is benign. Lower extremity strength equal.  No notable deficit noted. Cranial nerves II through XII are grossly intact. We will discharge him home on a low-sodium diet, push oral fluids. Continue his vitamin C, Coenzyme Q10, Colace, Neurontin, levothyroxine, lisinopril, multivitamin, Norvasc, trazodone, and fish oil that he was on at home. Continue metoprolol 25 mg daily. Continue the atorvastatin 80 mg at night and aspirin 81 mg daily as well as Plavix 75 mg daily. These meds have been placed in his discharge instructions. He is to follow up with his primary care physician, Dr. Aarti Guillory, in one week. [facility] has been arranged for him. If he has worsening symptoms, he should come back to the emergency room which we have discussed, and he will be given discharge instructions on post stroke. Otherwise, continue with a rolling walker and general light activity at home. Recommend no smoking or alcohol. The patient is stable for release from the hospital.  35 minutes discharge time.       Thuy Britt MD      [id number]  D:  01/19/2022 13:44  T:  01/19/2022 17:00  JOB #:  [id number]  CC:  Andrew Ambrose MD

## 2022-01-19 NOTE — DISCHARGE INSTRUCTIONS
DISCHARGE SUMMARY from Nurse    PATIENT INSTRUCTIONS:    After general anesthesia or intravenous sedation, for 24 hours or while taking prescription Narcotics:  · Limit your activities  · Do not drive and operate hazardous machinery  · Do not make important personal or business decisions  · Do  not drink alcoholic beverages  · If you have not urinated within 8 hours after discharge, please contact your surgeon on call. Report the following to your surgeon:  · Excessive pain, swelling, redness or odor of or around the surgical area  · Temperature over 100.5  · Nausea and vomiting lasting longer than 4 hours or if unable to take medications  · Any signs of decreased circulation or nerve impairment to extremity: change in color, persistent  numbness, tingling, coldness or increase pain  · Any questions    What to do at Home:  Recommended activity: Activity as tolerated,     If you experience any of the following symptoms loss of balance, blurred vision, facial droop, arm numbness or weakness, slurred speech, please follow up with 911. *  Please give a list of your current medications to your Primary Care Provider. *  Please update this list whenever your medications are discontinued, doses are      changed, or new medications (including over-the-counter products) are added. *  Please carry medication information at all times in case of emergency situations. These are general instructions for a healthy lifestyle:    No smoking/ No tobacco products/ Avoid exposure to second hand smoke  Surgeon General's Warning:  Quitting smoking now greatly reduces serious risk to your health.     Obesity, smoking, and sedentary lifestyle greatly increases your risk for illness    A healthy diet, regular physical exercise & weight monitoring are important for maintaining a healthy lifestyle    You may be retaining fluid if you have a history of heart failure or if you experience any of the following symptoms:  Weight gain of 3 pounds or more overnight or 5 pounds in a week, increased swelling in our hands or feet or shortness of breath while lying flat in bed. Please call your doctor as soon as you notice any of these symptoms; do not wait until your next office visit. The discharge information has been reviewed with the patient. The patient verbalized understanding. Discharge medications reviewed with the patient and appropriate educational materials and side effects teaching were provided. ___________________________________________________________________________________________________________________________________      Patient Education        Learning About Medicines That Lower Your Risk of Another Stroke  Introduction  After you have a stroke, going home may be hard, both for you and for your loved ones. There is a lot to think about. Remember to take one day at a time. An important part of your treatment will be to prevent another stroke. And a big part of that is taking medicines. Some of the medicines your doctor may have you take include:  · Aspirin or other blood thinners. They help prevent blood clots. Most strokes are caused by blood clots. · Statins and other medicines to lower cholesterol. High cholesterol raises your stroke risk. · Medicines for high blood pressure or diabetes. These conditions raise your stroke risk. All medicines can cause side effects. So it is important to understand the pros and cons of any medicine you take. It is also important to take your medicines exactly as your doctor tells you to. Be sure to tell your doctor if you take any other prescription medicine, over-the-counter medicine, vitamins, supplements, or herbal remedies. Have a pill plan  You may have several pills to take every day. Having a plan for how you will remember to take them may help ease your fears and stress. To make a pill plan, write a list of your medicines.  Then write down the details for each one. Include when you started using each medicine, when you take it, how much you take each time (number of pills and milligrams in each pill), and any side effects. Before you leave the hospital, be sure to ask questions if you don't understand or need help with your pill plan. And be sure you know who to call when you have questions at home. Blood thinners  One of the best things you can do to prevent another stroke is to take a medicine called a blood thinner. These medicines don't really thin your blood. They work by helping to prevent blood clots. Blood clots can cause a stroke if they block a blood vessel in the brain. So when you prevent blood clots, you help prevent a stroke. Antiplatelets are a type of blood thinner. They help keep platelets from sticking together and forming blood clots. (A platelet is a type of blood cell.)  Examples of antiplatelets include:  · Aspirin (Jina, Bufferin, Ecotrin). · Aspirin combined with dipyridamole (Aggrenox). · Clopidogrel (Plavix). Another type of blood thinner, called an anticoagulant, may be used if you also have atrial fibrillation. This is a heart rhythm problem. It raises your risk of having a stroke. Be sure to learn how to take your medicine safely. Blood thinners can cause serious bleeding problems. Statins  Statins lower the amount of cholesterol in your blood. If you have too much cholesterol, it starts to build up in blood vessels. And that's how most heart and blood flow problems, including strokes, start. Statins also reduce inflammation around the cholesterol buildup. This may lower the risk that the buildup will break apart and cause a blood clot that can lead to a stroke. Examples of statins include:  · Atorvastatin (Lipitor). · Lovastatin (Mevacor). · Pravastatin (Pravachol). · Simvastatin (Zocor). Blood pressure medicines  If you have high blood pressure, you may take medicines to lower it. High blood pressure damages blood vessels. Damaged vessels clog up more easily. And that can cause a stroke. If you were taking blood pressure pills before, your doctor may have you keep taking them. Or your doctor may have you take a different type. Blood pressure medicines may include:  · ACE (angiotensin-converting enzyme) inhibitors. · Angiotensin II receptor blockers (ARBs). · Beta-blockers. · Diuretics (water pills). · Calcium channel blockers. Follow-up care is a key part of your treatment and safety. Be sure to make and go to all appointments, and call your doctor if you are having problems. It's also a good idea to know your test results and keep a list of the medicines you take. Where can you learn more? Go to http://www.gray.com/  Enter J249 in the search box to learn more about \"Learning About Medicines That Lower Your Risk of Another Stroke. \"  Current as of: July 6, 2021               Content Version: 13.0  © 3953-3835 [employer]. Care instructions adapted under license by [employer] (which disclaims liability or warranty for this information). If you have questions about a medical condition or this instruction, always ask your healthcare professional. [place] [address] any warranty or liability for your use of this information. Patient Education        Stroke: Care Instructions  Your Care Instructions     You have had a stroke. This means that the blood flow to a part of your brain was blocked for some time, which damages the nerve cells in that part of the brain. The part of your body controlled by that part of your brain may not function properly now. The brain is an amazing organ that can heal itself to some degree. The stroke you had damaged part of your brain. But other parts of your brain may take over in some way for the damaged areas. You have already started this process.   Your doctor will talk with you about what you can do to prevent another stroke. High blood pressure, high cholesterol, and diabetes are all risk factors for stroke. If you have any of these conditions, work with your doctor to make sure they are under control. Other risk factors for stroke include being overweight, smoking, and not getting regular exercise. Going home may be hard for you and your family. The more you can try to do for yourself, the better. Remember to take each day one at a time. Follow-up care is a key part of your treatment and safety. Be sure to make and go to all appointments, and call your doctor if you are having problems. It's also a good idea to know your test results and keep a list of the medicines you take. How can you care for yourself at home?    · Enter a stroke rehabilitation (rehab) program, if your doctor recommends it. Physical, speech, and occupational therapies can help you manage bathing, dressing, eating, and other basics of daily living.     · Do not drive until your doctor says it is okay.     · It is normal to feel sad or depressed after a stroke. If these feelings last, talk to your doctor.     · If you are having problems with urine leakage, go to the bathroom at regular times, including when you first wake up and at bedtime. Also, limit fluids after dinner.     · If you are constipated, drink plenty of fluids. If you have kidney, heart, or liver disease and have to limit fluids, talk with your doctor before you increase the amount of fluids you drink. Set up a regular time for using the toilet. If you continue to have constipation, your doctor may suggest using a bulking agent, such as Metamucil, or a stool softener, laxative, or enema. Medicines    · Take your medicines exactly as prescribed. Call your doctor if you think you are having a problem with your medicine. You may be taking several medicines.  ACE (angiotensin-converting enzyme) inhibitors, angiotensin II receptor blockers (ARBs), beta-blockers, diuretics (water pills), and calcium channel blockers control your blood pressure. Statins help lower cholesterol. Your doctor may also prescribe medicines for depression, pain, sleep problems, anxiety, or agitation.     · If your doctor has given you a blood thinner to prevent another stroke, be sure you get instructions about how to take your medicine safely. Blood thinners can cause serious bleeding problems.     · Do not take any over-the-counter medicines or herbal products without talking to your doctor first.     · If you take birth control pills or hormone therapy, talk to your doctor about whether they are right for you. For family members and caregivers    · Make the home safe. Set up a room so that your loved one does not have to climb stairs. Be sure the bathroom is on the same floor. Move throw rugs and furniture that could cause falls. Make sure that the lighting is good. Put grab bars and seats in tubs and showers.     · Find out what your loved one can do and what they need help with. Try not to do things for your loved one that your loved one can do on their own. Help them learn and practice new skills.     · Visit and talk with your loved one often. Try doing activities together that you both enjoy, such as playing cards or board games. Keep in touch with your loved one's friends as much as you can. Encourage them to visit.     · Take care of yourself. Do not try to do everything yourself. Ask other family members to help. Eat well, get enough rest, and take time to do things that you enjoy. Keep up with your own doctor visits, and make sure to take your medicines regularly. Get out of the house as much as you can. Join a local support group. Find out if you qualify for home health care visits to help with rehab or for adult day care. When should you call for help? Call 911 anytime you think you may need emergency care. For example, call if:    · You have signs of another stroke.  These may include:  ? Sudden numbness, tingling, weakness, or loss of movement in your face, arm, or leg, especially on only one side of your body. ? Sudden vision changes. ? Sudden trouble speaking. ? Sudden confusion or trouble understanding simple statements. ? Sudden problems with walking or balance. ? A sudden, severe headache that is different from past headaches. Call 911 even if these symptoms go away in a few minutes. Call your doctor now or seek immediate medical care if:    · You have new symptoms that may be related to your stroke, such as falls or trouble swallowing. Watch closely for changes in your health, and be sure to contact your doctor if you have any problems. Where can you learn more? Go to http://www.gray.com/  Enter C294 in the search box to learn more about \"Stroke: Care Instructions. \"  Current as of: July 6, 2021               Content Version: 13.0  © 8811-5641 [employer]. Care instructions adapted under license by [employer] (which disclaims liability or warranty for this information). If you have questions about a medical condition or this instruction, always ask your healthcare professional. [address] any warranty or liability for your use of this information.

## 2022-01-19 NOTE — PROGRESS NOTES
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 Pt noted to have MRI  finding of mass effect . If clinically significant, please document in progress notes and discharge summary if you are evaluating/treating any of the following: The medical record reflects the following:  Risk Factors: To ED  with left-sided numbness and tingling that started in the face left arm and left leg patient's symptoms started on Friday and has not progressed with some dysarthria pmh-CVA    Clinical Indicators: MRI 22 findings Mild areas of local mass effect are noted in association with the acute infarct in the right cerebral hemisphere. 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 Treatment: ECHO pending ,  Plavix , aspirin and neurology consulted, MRI ,CTA ,Neuro checks per stroke protocol and PT/OT and disposition planning  Thank you  [email or web address]  Options provided:  -- Cerebral edema  -- Brain compression  -- Cerebral edema and Brain compression  -- Other - I will add my own diagnosis  -- Disagree - Not applicable / Not valid  -- Disagree - Clinically unable to determine / Unknown  -- Refer to Clinical Documentation Reviewer    PROVIDER RESPONSE TEXT:    This patient has cerebral edema. Query created by:  Sherine Bond on 2022 2:54 PM      Electronically signed by:  Queen Tej HUGO 2022 11:50 AM

## 2022-01-19 NOTE — PROGRESS NOTES
D/C plan: Home w/ [facility]. Pt's friend will transport him home. Met w/ pt at bedside. Discussed HH w/ the pt. He is in agreement w/ d/c w/ HH and would like [facility]. Confirmed w/ the pt that he has a RW at home. Pt states his friend will transport him home. Medicare Important Message reviewed w/ the pt at bedside. Copy left for pt. Care Management Interventions  Mode of Transport at Discharge: Other (see comment) (Pt's friend will transport him home. )  Transition of Care Consult (CM Consult): [address]: Yes  Discharge Durable Medical Equipment: No (The pt states he has a RW at home. )  Physical Therapy Consult: Yes  Occupational Therapy Consult: Yes  Speech Therapy Consult: No  Support Systems: Spouse/Significant Other  Confirm Follow Up Transport: Friends  The Plan for Transition of Care is Related to the Following Treatment Goals : home w/ Carly Zarate. The Patient and/or Patient Representative was Provided with a Choice of Provider and Agrees with the Discharge Plan?: Yes (The pt is alert and oriented.  Pt is in agreement w/ the d/c plan. )  Freedom of Choice List was Provided with Basic Dialogue that Supports the Patient's Individualized Plan of Care/Goals, Treatment Preferences and Shares the Quality Data Associated with the Providers?: Yes ([facility])  Discharge Location  Patient Expects to be Discharged to[de-identified] Home with home health ([facility])

## 2022-01-19 NOTE — PROGRESS NOTES
NEUROLOGY PROGRESS NOTE        Patient: Elisa Milian        Sex: male          DOA: 1/16/2022  YOB: 1935      Age:  82 y.o.         LOS: 2 days     Identification:  82 y. o. right-handed male with history of prostate cancer, amyloidosis, memory impairment, prior stroke, hypertension and hyperlipidemia, who presented with left-sided numbness.     SUBJECTIVE:   The patient continues to have numbness on the left side. He has right shoulder pain. Stroke Work-up:  Brain MRI: 1.  Patchy areas of acute infarction involving the right posterior cerebral artery distribution, as described above. 2.  Mild chronic small vessel changes with old left basal ganglia/corona radiata infarct. CT Head: Cerebral volume loss and mild bilateral periventricular and central white matter diminished attenuation which is nonspecific but likely to represent microvascular disease. Posterior lateral right thalamic/posterior right internal capsular small hypodensity possibly a lacunar infarct of uncertain acuity. Small right occipital hypodensity possibly an old infarct. CTA of head and neck: 1.  Occlusion of the P2 segment of the right posterior cerebral artery, corresponding to the location of acute infarction on MRI. 2.  Atherosclerotic vascular disease with mild areas of stenosis in the neck and involving the petrocavernous junction of the right internal carotid artery. 3.  Postsurgical changes in the left upper lobe with atelectasis, scarring, and/or infiltrate.  Dedicated imaging of the chest is suggested for further evaluation, as clinically warranted. Stenosis Key: Mild-less than 25% Mild to moderate- 25-50% Moderate- 40-60% Moderate to severe- 50-75% Severe-greater than 75%  Echocardiogram:   Left Ventricle: Left ventricle size is normal. Normal wall thickness. See diagram for wall motion findings. Normal left ventricular systolic function with a visually estimated EF of 60 - 65%.  Grade I diastolic dysfunction with normal LAP. E/e' ratio = 10.08   Saline contrast was given to evaluate for intracardiac shunt. No shunt seen. Bubble study was negative. Lipid panel:         Lab Results   Component Value Date/Time     Cholesterol, total 221 (H) 01/18/2022 02:47 AM     HDL Cholesterol 58 01/18/2022 02:47 AM     LDL, calculated 147.4 (H) 01/18/2022 02:47 AM     VLDL, calculated 15.6 01/18/2022 02:47 AM     Triglyceride 78 01/18/2022 02:47 AM     CHOL/HDL Ratio 3.8 01/18/2022 02:47 AM      HbA1c:         Lab Results   Component Value Date/Time     Hemoglobin A1c 5.9 (H) 01/17/2022 12:09 AM         REVIEW OF SYSTEMS: Denies chest pain, abdominal pain, nausea or vomiting. No fever or chills. OBJECTIVE:      Visit Vitals  BP (!) 168/70 (BP 1 Location: Left upper arm, BP Patient Position: At rest)   Pulse 69   Temp 98.4 °F (36.9 °C)   Resp 18   Ht 5' 5\" (1.651 m)   Wt 73.9 kg (163 lb)   SpO2 100%   BMI 27.12 kg/m²     Physical Exam:  GEN: Alert, NAD  EYES: conjunctiva normal, lids with out lesions  HEENT: MMM. HEART: RRR +S1 +S2  LUNGS: CTA B/L no rales or rhonchi. ABDOMEN: Soft, non-tender. EXTREMITIES: No edema cyanosis  SKIN: no rashes or skin breakdown, no nodules  NEURO: Alert, oriented x3. Speech is fluent. Cranials: Face is symmetric. Smiles symmetrically. EOMI, VFF. Tongue is midline. Motor: Full strength at all sites. No pronator drift. Sensory: Decreased on the left side. Coordination: Intact with no dysmetria during FNF.      Current Facility-Administered Medications   Medication Dose Route Frequency    0.9% sodium chloride infusion 25 mL  25 mL IntraVENous PRN    0.9% sodium chloride infusion  125 mL/hr IntraVENous CONTINUOUS    aspirin chewable tablet 81 mg  81 mg Oral DAILY    atorvastatin (LIPITOR) tablet 80 mg  80 mg Oral QHS    acetaminophen (TYLENOL) tablet 650 mg  650 mg Oral Q4H PRN    heparin (porcine) injection 5,000 Units  5,000 Units SubCUTAneous Q8H    albuterol (PROVENTIL HFA, VENTOLIN HFA, PROAIR HFA) inhaler 2 Puff  2 Puff Inhalation Q4H RT    traZODone (DESYREL) tablet 100 mg  100 mg Oral QHS    levothyroxine (SYNTHROID) tablet 175 mcg  175 mcg Oral ACB    gabapentin (NEURONTIN) capsule 300 mg  300 mg Oral BID    clopidogreL (PLAVIX) tablet 75 mg  75 mg Oral DAILY       Laboratory  Recent Results (from the past 24 hour(s))   ECHO ADULT COMPLETE    Collection Time: 01/18/22  1:00 PM   Result Value Ref Range    IVSd 0.9 0.6 - 1.0 cm    LVIDd 4.4 4.2 - 5.9 cm    LVIDs 3.1 cm    LVOT Diameter 2.0 cm    LVPWd 0.9 0.6 - 1.0 cm    LVOT SV 64.1 ml    Global Longitudinal Strain -15.2 %    EF BP 62 55 - 100 %    LV Ejection Fraction A2C 60 %    LV Ejection Fraction A4C 65 %    LV EDV A2C 60 mL    LV EDV A4C 81 mL    LV EDV BP 69 67 - 155 mL    LV ESV A2C 24 mL    LV ESV A4C 28 mL    LV ESV BP 26 22 - 58 mL    LVOT Peak Gradient 4 mmHg    LVOT Mean Gradient 2 mmHg    LVOT Peak Velocity 1.1 m/s    LVOT VTI 20.4 cm    RVIDd 2.5 cm    RV Free Wall Peak S' 13 cm/s    LA Diameter 3.2 cm    LA Volume A/L 27 mL    LA Volume 2C 30 18 - 58 mL    LA Volume 4C 20 18 - 58 mL    LA Volume BP 25 18 - 58 mL    LA Volume BP 25 18 - 58 mL    AV Area by Peak Velocity 1.8 cm2    AV Area by Peak Velocity 2.0 cm2    AV Area by Peak Velocity 1.6 cm2    AV Area by VTI 1.7 cm2    AV Area by VTI 1.9 cm2    AV Area by VTI 1.6 cm2    AV Peak Gradient 13 mmHg    AV Mean Gradient 6 mmHg    AV Peak Velocity 1.8 m/s    AV Mean Velocity 1.2 m/s    AV VTI 36.4 cm    MV A Velocity 0.88 m/s    MV E Wave Deceleration Time 274.5 ms    MV E Velocity 0.62 m/s    LV E' Lateral Velocity 8 cm/s    LV E' Septal Velocity 5 cm/s    TAPSE 2.5 (A) 1.5 - 2.0 cm    Aortic Root 3.0 cm    Fractional Shortening 2D 30 28 - 44 %    LV ESV Index BP 14 mL/m2    LV ESV Index A4C 15 mL/m2    LV EDV Index A4C 45 mL/m2    LV ESV Index A2C 13 mL/m2    LV EDV Index A2C 33 mL/m2    LVIDd Index 2.43 cm/m2    LVIDs Index 1.71 cm/m2    LV RWT Ratio 0.41 LV Mass 2D 128.0 88 - 224 g    LV Mass 2D Index 70.7 49 - 115 g/m2    MV E/A 0.70     E/E' Ratio (Averaged) 10.08     E/E' Lateral 7.75     E/E' Septal 12.40     LA Volume Index A/L 15 16 - 34 mL/m2    LVOT Stroke Volume Index 35.4 mL/m2    LVOT Area 3.1 cm2    LA Volume Index 2C 17 16 - 34 mL/m2    LA Volume Index 4C 11 (A) 16 - 34 mL/m2    LA Size Index 1.77 cm/m2    LA/AO Root Ratio 1.07     Ao Root Index 1.66 cm/m2    AV Velocity Ratio 0.61     LVOT:AV VTI Index 0.56     LV EDV Index BP 38 mL/m2       Radiology:  MRI BRAIN WO CONT    Result Date: 1/17/2022  EXAM: MRI brain without contrast 1/17/2022. CLINICAL INDICATION/HISTORY: 2 day history of left-sided numbness COMPARISON: CT scan of the head from 1/17/2022 TECHNIQUE: Multiplanar multisequential images of the brain were obtained without contrast. _______________ FINDINGS: BRAIN AND POSTERIOR FOSSA: There is an area of acute infarct extending along the inferior aspect of the right occipital lobe. Patchy involvement extends along the inferior margin of the right posterior temporal lobe, including the mesial temporal lobe and there is patchy posterior extension into the occipital lobe. There is an associated 1 cm area of acute infarct along the lateral aspect of the right thalamus. Mild atrophy and chronic small vessel changes are present in the periventricular and subcortical white matter of both cerebral hemispheres. There is a small area of old infarct along the posterior aspect of the left putamen and extending into the white matter along the posterior body of the left lateral ventricle. There is no intracranial hemorrhage. Mild areas of local mass effect are noted in association with the acute infarct in the right cerebral hemisphere. There are no significant additional areas of mass effect. There is no midline shift.  There are no significant additional areas of abnormal parenchymal signal. No additional areas of true restricted diffusion or demonstrable. EXTRA-AXIAL SPACES AND MENINGES: There are no abnormal extra-axial fluid collections. Lucinda Galloway VASCULAR: The visualized portions of the intracranial carotid and vertebrobasilar systems demonstrate patent appearing flow voids. CALVARIA: Unremarkable. SINUSES: Clear, as visualized. CRANIOCERVICAL JUNCTION: Within normal limits for age. OTHER: None. _______________     1. Patchy areas of acute infarction involving the right posterior cerebral artery distribution, as described above. 2.  Mild chronic small vessel changes with old left basal ganglia/corona radiata infarct. Initial interpretation was provided to the emergency room by [facility]. CT HEAD WO CONT    Result Date: 1/17/2022  EXAM: CT head INDICATION: Left-sided numbness for 48 hours. COMPARISON: November 29, 2013. TECHNIQUE: Axial CT imaging of the head was performed without intravenous contrast. Dose reduction techniques used: automated exposure control, adjustment of the mAs and/or kVp according to patient size, and iterative reconstruction techniques. Digital imaging and communications in Medicine (DICOM) format image data are available to nonaffiliated external healthcare facilities or entities on a secure, media free, reciprocally searchable basis with patient authorization for at least 12 months after this study. _______________ FINDINGS: BRAIN AND POSTERIOR FOSSA: There is cerebral volume loss with prominence of the lateral and the third ventricles. The cortical sulci are widened appropriately. The fourth ventricle and basal cisterns are normally outlined. There is mild bilateral periventricular and central white matter diminished attenuation. There is a small hypodensity lateral posterior right thalamus/posterior right internal capsule. There is a hypodensity right occipital lobe. There is no hemorrhage or midline shift. EXTRA-AXIAL SPACES AND MENINGES: There are no abnormal extra-axial fluid collections. CALVARIUM: Intact. SINUSES: Clear. OTHER: None. _______________     Cerebral volume loss and mild bilateral periventricular and central white matter diminished attenuation which is nonspecific but likely to represent microvascular disease. Posterior lateral right thalamic/posterior right internal capsular small hypodensity possibly a lacunar infarct of uncertain acuity. Small right occipital hypodensity possibly an old infarct. CTA HEAD NECK W WO CONT    Result Date: 1/17/2022  CTA head and CTA neck with contrast 1/17/2022. CLINICAL INDICATION/HISTORY:   Left-sided paresthesias for 2 days. Stuttering speech. COMPARISON:   CT scan of the head from 1/17/2022. TECHNIQUE: Multiple axial CT images of the neck were obtained extending from the level of the aortic arch to the skull base during the dynamic infusion of 100 cc of [id number]-370 utilizing a CTA protocol. Multiple axial CT images of the head were obtained extending from below the level of the skull base to the vertex after the administration of the IV contrast utilizing a CTA protocol. Multiplanar reconstructions were obtained, including MIP reconstructions as well as curved planar reformats. Multiple additional 3-D surface rendering reformations were performed at a separate workstation. Dose reduction techniques:  Automated exposure control, mAs and/or kVp reductions based on patient size, and iterative reconstruction. The specific techniques utilized on this CT exam have been documented in the patient's electronic medical record. Digital imaging and communications and medicine (DICOM) format image data are available to nonaffiliated external healthcare facilities or entities on a secure, media free, reciprocally searchable basis with patient authorization for at least a 12 month period after this study. FINDINGS: GREAT VESSEL ORIGINS:   The origins of the great vessels from the aortic arch and the origin of the right common carotid artery from the innominate artery are patent.   The great vessels are mildly tortuous suggesting the sequela of long-standing and/or uncontrolled hypertension. CERVICAL VASCULATURE:   Both common carotid arteries are patent throughout the thoracic and cervical segments. A reverse bifurcation is incidentally noted on the left. There is eccentric plaque involving the origin of the right internal carotid artery. Mild stenosis is present, 0% by NASCET criteria based on the endoluminal reconstructions. There is also minimal irregularity of the left internal carotid artery origin, also with 0% stenosis by NASCET criteria. The cervical portions of both internal carotid arteries are otherwise patent. Left vertebral artery dominance is noted. Vertebral artery origins are patent. The right vertebral artery is partially obscured due to beam hardening artifacts related to dense contrast material within the adjacent veins but the vessel is grossly patent along its T1 segment. Both vertebral arteries are otherwise well visualized and patent throughout their cervical segments. INTRACRANIAL VASCULATURE:   Focal calcification is noted at the petrocavernous junction of the right internal carotid artery with mild stenosis. The carotid siphons are otherwise patent. The vertebrobasilar system is patent. There is abrupt occlusion of the P2 segment of the right posterior cerebral artery, corresponding to the distribution of acute infarction on the subsequently obtained MRI. There is no hemodynamically significant stenosis or occlusion involving the remainder of the main intracranial vessels. There is no evidence of aneurysm formation or underlying vascular malformation. OTHER:   Postsurgical changes are noted in the left upper lobe with left suprahilar atelectasis and/or scar. Underlying infiltrate cannot be excluded and the pulmonary processes are incompletely evaluated on this examination tailored for assessment of the cervical and intracranial vasculature.      1.  Occlusion of the P2 segment of the right posterior cerebral artery, corresponding to the location of acute infarction on MRI. 2.  Atherosclerotic vascular disease with mild areas of stenosis in the neck and involving the petrocavernous junction of the right internal carotid artery. 3.  Postsurgical changes in the left upper lobe with atelectasis, scarring, and/or infiltrate. Dedicated imaging of the chest is suggested for further evaluation, as clinically warranted. Stenosis Key: Mild-less than 25% Mild to moderate- 25-50% Moderate- 40-60% Moderate to severe- 50-75% Severe-greater than 75%     XR CHEST PORT    Result Date: 1/17/2022  CLINICAL HISTORY:  As above. COMPARISONS:  Chest x-ray 11/26/2015. Chest CT 11/26/2015 TECHNIQUE:  single frontal view of the chest ------------------------------------------ FINDINGS: Lungs:  Bandlike opacity in the left upper lobe with slight distortion of the left hilum, not appreciably changed and likely scar. No evidence of consolidation or definite acute lung process. No significant pleural fluid. Mediastinum: Unremarkable. Bones: No evidence of fracture or suspicious bone lesion. ------------------------------------------    No acute cardiopulmonary process. ECHO ADULT COMPLETE    Result Date: 1/18/2022    Left Ventricle: Left ventricle size is normal. Normal wall thickness. See diagram for wall motion findings. Normal left ventricular systolic function with a visually estimated EF of 60 - 65%. Grade I diastolic dysfunction with normal LAP. E/e' ratio = 10.08   Saline contrast was given to evaluate for intracardiac shunt. No shunt seen. Bubble study was negative.        ASSESSMENT/IMPRESSION:   Right PCA infarct in the setting of right PCA occlusion.  The patient was not a candidate for tPA or thrombectomy due to time of onset of symptoms.  I am not sure if coronavirus vaccine and any bearing on the current clinical picture.       RECOMMENDATIONS:  1. Continue aspirin 81 mg and Plavix 75 mg daily.   2. Continue atorvastatin 80 mg daily. 3. Continue telemetry monitoring while in-house. 4.  Neuro checks per stroke protocol  5.  Normotensive protocol. 6. Oral liquids for hydration. [address] to discharge home or rehab    Neurology signs off at this time. I will follow the patient as outpatient. Please do not hesitate to return with any questions.     Signed:  Alban Kumar MD  1/19/2022  12:29 PM

## 2022-01-20 ENCOUNTER — HOME HEALTH ADMISSION (OUTPATIENT)
Dept: HOME HEALTH SERVICES | Facility: HOME HEALTH | Age: 87
End: 2022-01-20
Payer: MEDICARE

## 2022-01-21 ENCOUNTER — HOME CARE VISIT (OUTPATIENT)
Dept: SCHEDULING | Facility: HOME HEALTH | Age: 87
End: 2022-01-21

## 2022-01-21 ENCOUNTER — HOME CARE VISIT (OUTPATIENT)
Dept: HOME HEALTH SERVICES | Facility: HOME HEALTH | Age: 87
End: 2022-01-21

## 2022-01-21 PROCEDURE — G0151 HHCP-SERV OF PT,EA 15 MIN: HCPCS

## 2022-01-21 PROCEDURE — G0299 HHS/HOSPICE OF RN EA 15 MIN: HCPCS

## 2022-01-22 NOTE — CASE COMMUNICATION
Patient became a non-admit after SN visit was open and started, due to patient being a [facility] patient. Manager needs to delete the visit please. the non-admit is in.

## 2022-06-07 ENCOUNTER — TRANSCRIBE ORDER (OUTPATIENT)
Dept: SCHEDULING | Age: 87
End: 2022-06-07

## 2022-06-07 DIAGNOSIS — R60.0 LOCALIZED EDEMA: Primary | ICD-10-CM

## 2022-06-07 DIAGNOSIS — C61 PROSTATE CANCER (HCC): Primary | ICD-10-CM

## 2022-06-08 ENCOUNTER — HOSPITAL ENCOUNTER (OUTPATIENT)
Dept: VASCULAR SURGERY | Age: 87
Discharge: HOME | End: 2022-06-08
Attending: STUDENT IN AN ORGANIZED HEALTH CARE EDUCATION/TRAINING PROGRAM
Payer: MEDICARE

## 2022-06-08 ENCOUNTER — HOSPITAL ENCOUNTER (OUTPATIENT)
Dept: GENERAL RADIOLOGY | Age: 87
Discharge: HOME | End: 2022-06-08
Attending: STUDENT IN AN ORGANIZED HEALTH CARE EDUCATION/TRAINING PROGRAM
Payer: MEDICARE

## 2022-06-08 DIAGNOSIS — R60.0 LOCALIZED EDEMA: ICD-10-CM

## 2022-06-08 DIAGNOSIS — C61 PROSTATE CANCER (HCC): ICD-10-CM

## 2022-06-08 PROCEDURE — 93970 EXTREMITY STUDY: CPT

## 2022-06-08 PROCEDURE — 73552 X-RAY EXAM OF FEMUR 2/>: CPT

## 2022-06-27 ENCOUNTER — APPOINTMENT (OUTPATIENT)
Dept: GENERAL RADIOLOGY | Age: 87
DRG: 683 | End: 2022-06-27
Attending: STUDENT IN AN ORGANIZED HEALTH CARE EDUCATION/TRAINING PROGRAM
Payer: MEDICARE

## 2022-06-27 ENCOUNTER — HOSPITAL ENCOUNTER (INPATIENT)
Age: 87
LOS: 2 days | Discharge: HOME | DRG: 683 | End: 2022-06-29
Attending: STUDENT IN AN ORGANIZED HEALTH CARE EDUCATION/TRAINING PROGRAM | Admitting: INTERNAL MEDICINE
Payer: MEDICARE

## 2022-06-27 ENCOUNTER — APPOINTMENT (OUTPATIENT)
Dept: CT IMAGING | Age: 87
DRG: 683 | End: 2022-06-27
Attending: STUDENT IN AN ORGANIZED HEALTH CARE EDUCATION/TRAINING PROGRAM
Payer: MEDICARE

## 2022-06-27 DIAGNOSIS — R19.7 DIARRHEA, UNSPECIFIED TYPE: Primary | ICD-10-CM

## 2022-06-27 DIAGNOSIS — Z93.3 COLOSTOMY IN PLACE (HCC): ICD-10-CM

## 2022-06-27 DIAGNOSIS — N17.9 ACUTE RENAL FAILURE, UNSPECIFIED ACUTE RENAL FAILURE TYPE (HCC): ICD-10-CM

## 2022-06-27 DIAGNOSIS — R53.1 LEFT-SIDED WEAKNESS: ICD-10-CM

## 2022-06-27 PROBLEM — Z90.49 S/P COLECTOMY: Status: ACTIVE | Noted: 2022-06-27

## 2022-06-27 PROBLEM — Z86.73 HISTORY OF CVA (CEREBROVASCULAR ACCIDENT): Status: ACTIVE | Noted: 2022-06-27

## 2022-06-27 PROBLEM — E86.0 DEHYDRATION: Status: ACTIVE | Noted: 2022-06-27

## 2022-06-27 LAB
ALBUMIN SERPL-MCNC: 4.4 G/DL (ref 3.4–5)
ALBUMIN/GLOB SERPL: 1 {RATIO} (ref 0.8–1.7)
ALP SERPL-CCNC: 208 U/L (ref 45–117)
ALT SERPL-CCNC: 38 U/L (ref 16–61)
ANION GAP SERPL CALC-SCNC: 8 MMOL/L (ref 3–18)
AST SERPL-CCNC: 36 U/L (ref 10–38)
BASOPHILS # BLD: 0 K/UL (ref 0–0.1)
BASOPHILS NFR BLD: 0 % (ref 0–2)
BILIRUB DIRECT SERPL-MCNC: 0.2 MG/DL (ref 0–0.2)
BILIRUB SERPL-MCNC: 0.4 MG/DL (ref 0.2–1)
BUN SERPL-MCNC: 33 MG/DL (ref 7–18)
BUN/CREAT SERPL: 15 (ref 12–20)
CALCIUM SERPL-MCNC: 10.3 MG/DL (ref 8.5–10.1)
CHLORIDE SERPL-SCNC: 106 MMOL/L (ref 100–111)
CO2 SERPL-SCNC: 24 MMOL/L (ref 21–32)
CREAT SERPL-MCNC: 2.25 MG/DL (ref 0.6–1.3)
DIFFERENTIAL METHOD BLD: ABNORMAL
EOSINOPHIL # BLD: 0 K/UL (ref 0–0.4)
EOSINOPHIL NFR BLD: 0 % (ref 0–5)
ERYTHROCYTE [DISTWIDTH] IN BLOOD BY AUTOMATED COUNT: 15.1 % (ref 11.6–14.5)
GLOBULIN SER CALC-MCNC: 4.6 G/DL (ref 2–4)
GLUCOSE SERPL-MCNC: 144 MG/DL (ref 74–99)
HCT VFR BLD AUTO: 47.7 % (ref 36–48)
HGB BLD-MCNC: 15.3 G/DL (ref 13–16)
IMM GRANULOCYTES # BLD AUTO: 0.1 K/UL (ref 0–0.04)
IMM GRANULOCYTES NFR BLD AUTO: 1 % (ref 0–0.5)
LACTATE SERPL-SCNC: 1.4 MMOL/L (ref 0.4–2)
LIPASE SERPL-CCNC: 156 U/L (ref 73–393)
LYMPHOCYTES # BLD: 1 K/UL (ref 0.9–3.6)
LYMPHOCYTES NFR BLD: 10 % (ref 21–52)
MCH RBC QN AUTO: 27.4 PG (ref 24–34)
MCHC RBC AUTO-ENTMCNC: 32.1 G/DL (ref 31–37)
MCV RBC AUTO: 85.5 FL (ref 78–100)
MONOCYTES # BLD: 0.8 K/UL (ref 0.05–1.2)
MONOCYTES NFR BLD: 8 % (ref 3–10)
NEUTS SEG # BLD: 7.9 K/UL (ref 1.8–8)
NEUTS SEG NFR BLD: 81 % (ref 40–73)
NRBC # BLD: 0 K/UL (ref 0–0.01)
NRBC BLD-RTO: 0 PER 100 WBC
PLATELET # BLD AUTO: 471 K/UL (ref 135–420)
PMV BLD AUTO: 9.3 FL (ref 9.2–11.8)
POTASSIUM SERPL-SCNC: 4.3 MMOL/L (ref 3.5–5.5)
PROT SERPL-MCNC: 9 G/DL (ref 6.4–8.2)
RBC # BLD AUTO: 5.58 M/UL (ref 4.35–5.65)
SODIUM SERPL-SCNC: 138 MMOL/L (ref 136–145)
TSH SERPL DL<=0.05 MIU/L-ACNC: 0.01 UIU/ML (ref 0.36–3.74)
WBC # BLD AUTO: 9.7 K/UL (ref 4.6–13.2)
WBC #/AREA STL HPF: NORMAL /HPF

## 2022-06-27 PROCEDURE — 89055 LEUKOCYTE ASSESSMENT FECAL: CPT

## 2022-06-27 PROCEDURE — 83605 ASSAY OF LACTIC ACID: CPT

## 2022-06-27 PROCEDURE — 74011000250 HC RX REV CODE- 250: Performed by: HOSPITALIST

## 2022-06-27 PROCEDURE — 93005 ELECTROCARDIOGRAM TRACING: CPT

## 2022-06-27 PROCEDURE — 99285 EMERGENCY DEPT VISIT HI MDM: CPT

## 2022-06-27 PROCEDURE — 71045 X-RAY EXAM CHEST 1 VIEW: CPT

## 2022-06-27 PROCEDURE — 74176 CT ABD & PELVIS W/O CONTRAST: CPT

## 2022-06-27 PROCEDURE — 74011250637 HC RX REV CODE- 250/637: Performed by: STUDENT IN AN ORGANIZED HEALTH CARE EDUCATION/TRAINING PROGRAM

## 2022-06-27 PROCEDURE — 87177 OVA AND PARASITES SMEARS: CPT

## 2022-06-27 PROCEDURE — 80048 BASIC METABOLIC PNL TOTAL CA: CPT

## 2022-06-27 PROCEDURE — 65270000029 HC RM PRIVATE

## 2022-06-27 PROCEDURE — 87506 IADNA-DNA/RNA PROBE TQ 6-11: CPT

## 2022-06-27 PROCEDURE — 74011250637 HC RX REV CODE- 250/637: Performed by: HOSPITALIST

## 2022-06-27 PROCEDURE — 84443 ASSAY THYROID STIM HORMONE: CPT

## 2022-06-27 PROCEDURE — 83690 ASSAY OF LIPASE: CPT

## 2022-06-27 PROCEDURE — 87324 CLOSTRIDIUM AG IA: CPT

## 2022-06-27 PROCEDURE — 74011250636 HC RX REV CODE- 250/636: Performed by: HOSPITALIST

## 2022-06-27 PROCEDURE — 74011250636 HC RX REV CODE- 250/636: Performed by: STUDENT IN AN ORGANIZED HEALTH CARE EDUCATION/TRAINING PROGRAM

## 2022-06-27 PROCEDURE — 80076 HEPATIC FUNCTION PANEL: CPT

## 2022-06-27 PROCEDURE — 85025 COMPLETE CBC W/AUTO DIFF WBC: CPT

## 2022-06-27 RX ORDER — SODIUM CHLORIDE 0.9 % (FLUSH) 0.9 %
5-40 SYRINGE (ML) INJECTION AS NEEDED
Status: DISCONTINUED | OUTPATIENT
Start: 2022-06-27 | End: 2022-06-29 | Stop reason: HOSPADM

## 2022-06-27 RX ORDER — ACETAMINOPHEN 325 MG/1
650 TABLET ORAL
Status: DISCONTINUED | OUTPATIENT
Start: 2022-06-27 | End: 2022-06-29 | Stop reason: HOSPADM

## 2022-06-27 RX ORDER — SODIUM CHLORIDE 0.9 % (FLUSH) 0.9 %
5-40 SYRINGE (ML) INJECTION EVERY 8 HOURS
Status: DISCONTINUED | OUTPATIENT
Start: 2022-06-27 | End: 2022-06-29 | Stop reason: HOSPADM

## 2022-06-27 RX ORDER — FACIAL-BODY WIPES
10 EACH TOPICAL DAILY PRN
Status: DISCONTINUED | OUTPATIENT
Start: 2022-06-27 | End: 2022-06-29 | Stop reason: HOSPADM

## 2022-06-27 RX ORDER — HEPARIN SODIUM 5000 [USP'U]/ML
5000 INJECTION, SOLUTION INTRAVENOUS; SUBCUTANEOUS EVERY 8 HOURS
Status: DISCONTINUED | OUTPATIENT
Start: 2022-06-27 | End: 2022-06-29 | Stop reason: HOSPADM

## 2022-06-27 RX ORDER — LANOLIN ALCOHOL/MO/W.PET/CERES
325 CREAM (GRAM) TOPICAL
COMMUNITY

## 2022-06-27 RX ORDER — METOPROLOL TARTRATE 25 MG/1
25 TABLET, FILM COATED ORAL 2 TIMES DAILY
Status: DISCONTINUED | OUTPATIENT
Start: 2022-06-27 | End: 2022-06-29 | Stop reason: HOSPADM

## 2022-06-27 RX ORDER — PANTOPRAZOLE SODIUM 40 MG/1
40 TABLET, DELAYED RELEASE ORAL
Status: DISCONTINUED | OUTPATIENT
Start: 2022-06-28 | End: 2022-06-29 | Stop reason: HOSPADM

## 2022-06-27 RX ORDER — ONDANSETRON 2 MG/ML
4 INJECTION INTRAMUSCULAR; INTRAVENOUS
Status: DISCONTINUED | OUTPATIENT
Start: 2022-06-27 | End: 2022-06-29 | Stop reason: HOSPADM

## 2022-06-27 RX ORDER — ONDANSETRON 4 MG/1
4 TABLET, ORALLY DISINTEGRATING ORAL
Status: COMPLETED | OUTPATIENT
Start: 2022-06-27 | End: 2022-06-27

## 2022-06-27 RX ORDER — PANTOPRAZOLE SODIUM 40 MG/1
40 TABLET, DELAYED RELEASE ORAL DAILY
COMMUNITY

## 2022-06-27 RX ORDER — GABAPENTIN 100 MG/1
100 CAPSULE ORAL 2 TIMES DAILY
Status: DISCONTINUED | OUTPATIENT
Start: 2022-06-27 | End: 2022-06-29 | Stop reason: HOSPADM

## 2022-06-27 RX ORDER — ACETAMINOPHEN 650 MG/1
650 SUPPOSITORY RECTAL
Status: DISCONTINUED | OUTPATIENT
Start: 2022-06-27 | End: 2022-06-29 | Stop reason: HOSPADM

## 2022-06-27 RX ORDER — SODIUM CHLORIDE 9 MG/ML
75 INJECTION, SOLUTION INTRAVENOUS CONTINUOUS
Status: DISCONTINUED | OUTPATIENT
Start: 2022-06-27 | End: 2022-06-29 | Stop reason: HOSPADM

## 2022-06-27 RX ORDER — ATORVASTATIN CALCIUM 20 MG/1
80 TABLET, FILM COATED ORAL
Status: DISCONTINUED | OUTPATIENT
Start: 2022-06-27 | End: 2022-06-29 | Stop reason: HOSPADM

## 2022-06-27 RX ORDER — CHOLECALCIFEROL TAB 125 MCG (5000 UNIT) 125 MCG
5000 TAB ORAL DAILY
COMMUNITY

## 2022-06-27 RX ORDER — METRONIDAZOLE 500 MG/100ML
500 INJECTION, SOLUTION INTRAVENOUS EVERY 8 HOURS
Status: DISCONTINUED | OUTPATIENT
Start: 2022-06-27 | End: 2022-06-29 | Stop reason: HOSPADM

## 2022-06-27 RX ORDER — CLOPIDOGREL BISULFATE 75 MG/1
75 TABLET ORAL DAILY
Status: DISCONTINUED | OUTPATIENT
Start: 2022-06-28 | End: 2022-06-29 | Stop reason: HOSPADM

## 2022-06-27 RX ORDER — PROMETHAZINE HYDROCHLORIDE 25 MG/1
12.5 TABLET ORAL
Status: DISCONTINUED | OUTPATIENT
Start: 2022-06-27 | End: 2022-06-29 | Stop reason: HOSPADM

## 2022-06-27 RX ADMIN — SODIUM CHLORIDE, POTASSIUM CHLORIDE, SODIUM LACTATE AND CALCIUM CHLORIDE 1000 ML: 600; 310; 30; 20 INJECTION, SOLUTION INTRAVENOUS at 12:53

## 2022-06-27 RX ADMIN — SODIUM CHLORIDE, PRESERVATIVE FREE 10 ML: 5 INJECTION INTRAVENOUS at 18:18

## 2022-06-27 RX ADMIN — HEPARIN SODIUM 5000 UNITS: 5000 INJECTION INTRAVENOUS; SUBCUTANEOUS at 18:14

## 2022-06-27 RX ADMIN — METRONIDAZOLE 500 MG: 500 INJECTION, SOLUTION INTRAVENOUS at 18:12

## 2022-06-27 RX ADMIN — METOPROLOL TARTRATE 25 MG: 25 TABLET, FILM COATED ORAL at 20:06

## 2022-06-27 RX ADMIN — ONDANSETRON 4 MG: 4 TABLET, ORALLY DISINTEGRATING ORAL at 12:53

## 2022-06-27 RX ADMIN — GABAPENTIN 100 MG: 100 CAPSULE ORAL at 20:06

## 2022-06-27 RX ADMIN — SODIUM CHLORIDE 75 ML/HR: 9 INJECTION, SOLUTION INTRAVENOUS at 18:14

## 2022-06-27 NOTE — PROGRESS NOTES
Progress Note  Date:2022       Room:[place] [address]  Patient Name:Ismael Gallardo     YOB: 1935     Age:86 y.o. Subjective    Subjective severe watery diarrhea and dehydration  Review of Systems   Objective         Vitals Last 24 Hours:  TEMPERATURE:  Temp  Av.8 °F (36.6 °C)  Min: 97.8 °F (36.6 °C)  Max: 97.8 °F (36.6 °C)  RESPIRATIONS RANGE: Resp  Av  Min: 18  Max: 18  PULSE OXIMETRY RANGE: SpO2  Av %  Min: 98 %  Max: 98 %  PULSE RANGE: Pulse  Av  Min: 98  Max: 98  BLOOD PRESSURE RANGE: Systolic (58HGM), ZLB:639 , Min:121 , MNK:716   ; Diastolic (40BIZ), GBB:77, Min:65, Max:65      I/O (24Hr): No intake or output data in the 24 hours ending 22 1527  Objective  Labs/Imaging/Diagnostics    Labs:  CBC:Recent Labs     22  1045   WBC 9.7   RBC 5.58   HGB 15.3   HCT 47.7   MCV 85.5   RDW 15.1*   *     CHEMISTRIES:  Recent Labs     22  1045      K 4.3      CO2 24   BUN 33*   CREA 2.25*   CA 10.3*   PT/INR:No results for input(s): INR, INREXT in the last 72 hours. No lab exists for component: PROTIME  APTT:No results for input(s): APTT in the last 72 hours. LIVER PROFILE:  Recent Labs     22  1045   AST 36   ALT 38     Lab Results   Component Value Date/Time    ALT (SGPT) 38 2022 10:45 AM    AST (SGOT) 36 2022 10:45 AM    Alk. phosphatase 208 (H) 2022 10:45 AM    Bilirubin, direct 0.2 2022 10:45 AM    Bilirubin, total 0.4 2022 10:45 AM       Imaging Last 24 Hours:  CT ABD PELV WO CONT    Result Date: 2022  EXAM: CT of the abdomen and pelvis INDICATION: Abdominal pain. COMPARISON: None. TECHNIQUE: Axial CT imaging of the abdomen and pelvis was performed without intravenous contrast. Multiplanar reformats were generated. One or more dose reduction techniques were used on this CT: automated exposure control, adjustment of the mAs and/or kVp according to patient size, and iterative reconstruction techniques. The specific techniques used on this CT exam have been documented in the patient's electronic medical record. Digital Imaging and Communications in Medicine (DICOM) format image data are available to nonaffiliated external healthcare facilities or entities on a secure, media free, reciprocally searchable basis with patient authorization for at least a 12-month period after this study. . _______________ FINDINGS: LOWER CHEST: Unremarkable. LIVER, BILIARY: Liver is normal. No biliary dilation. Gallbladder is unremarkable. PANCREAS: Normal. SPLEEN: Normal. ADRENALS: Normal. KIDNEYS/URETERS/BLADDER: Normal. PELVIC ORGANS: Portions obscured by metallic hip hardware artifact. Brian Big Clifty VASCULATURE: Unremarkable LYMPH NODES: No enlarged lymph nodes. GASTROINTESTINAL TRACT: No bowel dilation or wall thickening. Left-sided colostomy noted. BONES: No acute or aggressive osseous abnormalities identified. OTHER: None. _______________     No acute intra-abdominal pathology identified. XR CHEST PORT    Result Date: 6/27/2022  A portable AP radiograph of the chest was obtained at 1202 hours: INDICATION:  Abdominal pain, diarrhea, dehydration. COMPARISON:  1/17/2022. FINDINGS:  Heart and mediastinum: Unremarkable. Lungs and pleura: There is a prominent thick band of probable scar in the left upper lobe extending to the left hilum similar to the prior study. There is no new consolidation, pleural effusion, or pneumothorax. Aorta: Unremarkable. Bones: Degenerative changes are seen in the spine. Other: None. Stable chest again demonstrating prominent scar left upper lobe with distortion of the adjacent pulmonary vasculature. No acute process.      Assessment//Plan           Patient Active Problem List    Diagnosis Date Noted    Prostate Cancer, [facility] before 2010 02/06/2020    Nocturia 02/10/2020    Acute kidney failure ([address] 06/27/2022    CVA (cerebral vascular accident) ([address] 01/17/2022    Other and unspecified noninfectious gastroenteritis and colitis(558.9) 11/12/2012    Abdominal pain 11/12/2012    Nausea & vomiting 11/12/2012    Hypertension     Syncope     DJD (degenerative joint disease)     Chest pain, precordial     Hyperlipidemia     Stroke ([facility]     Amyloidosis of lung ([facility]      Assessment & Plan  81 yo male with hx of colostomy for perforated diverticulitis presented with severe watery diarrhea,  Dehydration and acute renal injury. Until proven otherwise this is an infectious bacterial enterocolitis.  Keep in isolation and do stool microbiology testing as discussed with Dr Williams Miranda from the ER      Electronically signed by Issa Elizabeth MD on 6/27/2022 at 3:27 PM

## 2022-06-27 NOTE — ED NOTES
TRANSFER - OUT REPORT:    Verbal report given to Daniela Chen RN (name) on Bernard Ng  being transferred to Medical (unit) for routine progression of care       Report consisted of patients Situation, Background, Assessment and   Recommendations(SBAR). Information from the following report(s) SBAR, ED Summary, [facility] and Recent Results was reviewed with the receiving nurse. Lines:   Peripheral IV 06/27/22 Left Antecubital (Active)   Site Assessment Clean, dry, & intact 06/27/22 1138   Phlebitis Assessment 0 06/27/22 1138   Infiltration Assessment 0 06/27/22 1138   Dressing Status Clean, dry, & intact 06/27/22 1138   Dressing Type Transparent 06/27/22 1138   Hub Color/Line Status Patent; Flushed 06/27/22 1138   Action Taken Blood drawn 06/27/22 1138        Opportunity for questions and clarification was provided.       Patient transported with:   Registered Nurse

## 2022-06-27 NOTE — ED PROVIDER NOTES
EMERGENCY DEPARTMENT HISTORY AND PHYSICAL EXAM  THE [address] SURGICAL/ONCOLOGY        Date: 6/27/2022  Patient Name: Sebastián Wing    History of Presenting Illness     Chief Complaint   Patient presents with    Diarrhea    Dehydration    Abdominal Pain       History Provided By: Patient    HPI:  Sebastián Wing is a 82 y.o. male with a history of colostomy, cva, left sided weakness who presents today with abdominal pain, 12-15 bowel movements (emptying his ostomy bag that many times per day) clear output for 7 days with rice water appearance. Now more yellow. denies fever, had one episode of vomiting earlier in the week. Has a history of GI bleed, has seen black stool, but none recently. .    Symptoms started 7 days ago. The patient denies any aggravating or alleviating factors - and has not taken any medications in an attempt to alleviate his symptoms.     PMH, PSH, family history, social history, allergies reviewed with the patient with significant items noted above    PCP: Grace Bojorquez MD    Current Facility-Administered Medications   Medication Dose Route Frequency Provider Last Rate Last Admin    sodium chloride (NS) flush 5-40 mL  5-40 mL IntraVENous Q8H Mary Kendall MD   10 mL at 06/27/22 1818    sodium chloride (NS) flush 5-40 mL  5-40 mL IntraVENous PRN Mary Kendall MD        acetaminophen (TYLENOL) tablet 650 mg  650 mg Oral Q6H PRN Mary Kendall MD        Or    acetaminophen (TYLENOL) suppository 650 mg  650 mg Rectal Q6H PRN Mary Kendall MD        bisacodyL (DULCOLAX) suppository 10 mg  10 mg Rectal DAILY PRN Mary Kendall MD        promethazine (PHENERGAN) tablet 12.5 mg  12.5 mg Oral Q6H PRN Mary Kendall MD        Or    ondansetron [facility]) injection 4 mg  4 mg IntraVENous Q6H PRN Mary Kendall MD        heparin (porcine) injection 5,000 Units  5,000 Units SubCUTAneous Q8H Mary Kendall MD   5,000 Units at 06/27/22 1814    0.9% sodium chloride infusion  75 mL/hr IntraVENous CONTINUOUS Mary Kendall MD 75 mL/hr at 06/27/22 1814 75 mL/hr at 06/27/22 1814    [START ON 6/28/2022] levothyroxine (SYNTHROID) tablet 175 mcg  175 mcg Oral ACCHIRAG Dyer MD        metoprolol tartrate (LOPRESSOR) tablet 25 mg  25 mg Oral BID Karen Dyer MD   25 mg at 06/27/22 2006    atorvastatin (LIPITOR) tablet 80 mg  80 mg Oral QHS MD Miguel Angel Coolale Donald ON 6/28/2022] clopidogreL (PLAVIX) tablet 75 mg  75 mg Oral DAILY Karen Dyer MD        gabapentin (NEURONTIN) capsule 100 mg  100 mg Oral BID Karen Dyer MD   100 mg at 06/27/22 2006    metroNIDAZOLE (FLAGYL) IVPB premix 500 mg  500 mg IntraVENous Q8H Karen Dyer  mL/hr at 06/27/22 1812 500 mg at 06/27/22 1812    [START ON 6/28/2022] pantoprazole (PROTONIX) tablet 40 mg  40 mg Oral ACB Karen Dyer MD           Past History     Past Medical History:  Past Medical History:   Diagnosis Date    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    Cancer [facility])     prostate and thyroid    Chest pain, precordial     Dementia ([address]     DJD (degenerative joint disease)     Hyperlipidemia     Hypertension     Stroke ([address]     Syncope     Thyroid disease     cancer- twice       Past Surgical History:  Past Surgical History:   Procedure Laterality Date    HX CARPAL TUNNEL RELEASE      twice    HX HIP REPLACEMENT      two    HX ORTHOPAEDIC      bilateral hip surgery    HX PROSTATECTOMY      cancer    HX THORACOTOMY      dx:  amyloidosis    KS REPAIR ROTATOR CUFF,CHRONIC      three times    THYROIDECTOMY      cancer       Family History:  No family history on file. Social History:  Social History     Tobacco Use    Smoking status: Never Smoker    Smokeless tobacco: Never Used   Substance Use Topics    Alcohol use: No    Drug use: No       Allergies: Allergies   Allergen Reactions    Lisinopril Other (comments)     Unknown reaction.     Morphine Other (comments)     \"goes crazy\"       Review of Systems   Review of Systems    In addition to that documented in the HPI above  All other review of systems negative    Constitutional: Denies fevers or chills  Eyes: Denies vision changes  ENMT: Denies sore throat  CV: Denies chest pain  Resp: Denies SOB  GI: Denies vomiting or diarrhea  : Denies painful urination  MSK: Denies recent trauma  Skin: Denies new rashes  Neuro: Denies new numbness or tingling or weakness  Endocrine: Denies polyuria  Heme: Denies bleeding disorders    Physical Exam     Vitals:    06/27/22 1043 06/27/22 1901 06/27/22 2218   BP: 121/65 129/60 129/67   Pulse: 98 97 64   Resp: 18 16 18   Temp: 97.8 °F (36.6 °C) 98.1 °F (36.7 °C) 97.6 °F (36.4 °C)   SpO2: 98% 99% 98%   Weight: 63.5 kg (140 lb)  62.1 kg (136 lb 14.4 oz)   Height: 5' 5\" (1.651 m)       Physical Exam    Nursing notes and vital signs reviewed    General: Patient is awake and alert, resting comfortably in no acute distress  Head: Normocephalic and atraumatic  Eyes: Extraocular muscles intact, no conjunctival pallor  Ear, nose, throat: Normal external exam, trachea midline  Neck: Normal range of motion, no gross motor difficulty  Cardiovascular: RRR,  murmur auscultated, warm, well-perfused extremities  Respiratory: Patient is in no respiratory distress, lungs CTAB  GI: soft, non-tender, non-distended, not consistent with acute abdomen, ostomy in place, yellow stool, no blood, not black  MSK: No gross deformities appreciated, no lesions  Extremities: pulses intact with good cap refills, no LE pitting edema or calf tenderness  Skin: Warm, dry, and intact  Neuro: The patient is alert and oriented, no gross motor or sensory defects noted. Psych: Appropriate mood and affect. Medical Decision Making   I am the first provider for this patient. I reviewed the vital signs, available nursing notes, past medical history, past surgical history, family history and social history. Vital Signs-Reviewed the patient's vital signs.   Visit Vitals  /67 (BP 1 Location: Right upper arm, BP Patient Position: Sitting)   Pulse 64   Temp 97.6 °F (36.4 °C)   Resp 18   Ht 5' 5\" (1.651 m)   Wt 62.1 kg (136 lb 14.4 oz)   SpO2 98%   BMI 22.78 kg/m²       Pulse Oximetry Analysis - 98% on room air     Cardiac Monitor:  Rate: 64 bpm  Rhythm: NSR    EKG interpretation: (Preliminary)  Interpreted by the EP. EKG non diagnostic, without acute ST elevation, arrhythmia, prolonged QT, or evidence of [place]  Provider Notes (Medical Decision Making):   82 y.o., male, presents to the Emergency Department with abdominal pain, diarrhea     DDx in this patient with abd pain includes Pancreatitis, Cholecystitis, Cholangitis, Hepatitis, Appendicitis, Diverticulitis, Colitis, Gastritis, PUD, Ureterolithiasis, SBO, Gastroparesis, Bowel Perforation, Hernia, Mesenteric Ischemia     Most likely viral enteritis, but also could be bacteria infection    Procedures:  Procedures    ED Course:   ED Course as of 06/27/22 2235   Mon Jun 27, 2022   1116 States he is filling 12-15 ostomy bags per day of diarrhea. Mild left shift on CBC, no leukocytosis. [DM]   6280 New acute kidney injury, giving fluids. Lactate normal [DM]   1416 Last creatinine 0.96 5/1/2022 [DM]   9708 2:56 PM  Will plan to admit for acute kidney injury, diarrhea. The patient understands and agrees with the plan. Spoke with Dr. Deila Barton the on call admitting clinician who agrees to admit patient. Appreciate the assistance in the care of this patient. [DM]   1 Dr. Barry Garcia, will follow along, agrees with stool workup.   [DM]      ED Course User Index  [DM] Kathe Wolff MD            Diagnostic Study Results     Orders Placed This Encounter    OVA & PARASITES, STOOL     Standing Status:   Standing     Number of Occurrences:   1    C. DIFFICILE AG & TOXIN A/B     Standing Status:   Standing     Number of Occurrences:   1    ENTERIC BACTERIA PANEL, DNA     Standing Status:   Standing     Number of Occurrences:   1    XR CHEST PORT     Standing Status:   Standing     Number of Occurrences:   1 Order Specific Question:   Reason for Exam     Answer:   abd pain    CT ABD PELV WO CONT     Standing Status:   Standing     Number of Occurrences:   1     Order Specific Question:   Transport     Answer:   BED [2]     Order Specific Question:   Reason for Exam     Answer:   diarrhea. Order Specific Question:   Type of contrast.  PLEASE NOTE: IV contrast is NOT utilized with this order.      Answer:   None     Order Specific Question:   Decision Support Exception     Answer:   Emergency Medical Condition (MA) [1]    CBC WITH AUTOMATED DIFF     Standing Status:   Standing     Number of Occurrences:   1    BASIC METABOLIC PANEL     Standing Status:   Standing     Number of Occurrences:   1    LIPASE     Standing Status:   Standing     Number of Occurrences:   1    HEPATIC FUNCTION PANEL     Standing Status:   Standing     Number of Occurrences:   1    URINALYSIS W/ RFLX MICROSCOPIC     Standing Status:   Standing     Number of Occurrences:   1    LACTIC ACID     Standing Status:   Standing     Number of Occurrences:   1    METABOLIC PANEL, BASIC     Standing Status:   Standing     Number of Occurrences:   3    MAGNESIUM     Standing Status:   Standing     Number of Occurrences:   13    CBC WITH AUTOMATED DIFF     Standing Status:   Standing     Number of Occurrences:   3    TSH 3RD GENERATION     Standing Status:   Standing     Number of Occurrences:   1    WBC, STOOL     Standing Status:   Standing     Number of Occurrences:   1    ADULT DIET Easy to Chew; Low Fat/Low Chol/High Fiber/ELVER     Standing Status:   Standing     Number of Occurrences:   1     Order Specific Question:   Primary Diet:     Answer:   Easy to [email or web address]     Order Specific Question:   Cardiac Restriction:     Answer:   Low Fat/Low Chol/High Fiber/ELVER    VITAL SIGNS     Per unit routine     Standing Status:   Standing     Number of Occurrences:   1    ACTIVITY AS TOLERATED W/ASSIST     Standing Status:   Standing     Number of Occurrences:   1    WEIGH PATIENT     Standing Status:   Standing     Number of Occurrences:   1    INTAKE AND OUTPUT     Call for urine ouput less than 120ml in 4 hours     Standing Status:   Standing     Number of Occurrences:   1    NURSING-MISCELLANEOUS: Palpate, scan or straight cath and document bladder residual as needed CONTINUOUS     Standing Status:   Standing     Number of Occurrences:   1     Order Specific Question:   Description of Order:     Answer:   Palpate, scan or straight cath and document bladder residual as needed    STRAIGHT CATHETER (NURSING) PRN Routine PRN inability to void. If necessary to cath a second time, use indwelling urinary catheter, if residual greater than 300 ml leave in and discontinue in 24 hours. PRN inability to void. If necessary to cath a second time, use indwelling urinary catheter, if residual greater than 300 ml leave in and discontinue in 24 hours. Standing Status:   Standing     Number of Occurrences:   73412    INCENTIVE SPIROMETRY     Standing Status:   Standing     Number of Occurrences:   1    APPLY/MAINTAIN SEQUENTIAL COMPRESSION DEVICE     When appropriate prophylactic therapy cannot be provided due to patient limitations, serial screening Duplex Doppler Ultrasound studies of the legs may be considered to assess for development of DVT. Standing Status:   Standing     Number of Occurrences:   1    FULL CODE     Standing Status:   Standing     Number of Occurrences:   1    IP CONSULT TO GASTROENTEROLOGY     Standing Status:   Standing     Number of Occurrences:   1     Order Specific Question:   Reason for Consult: Answer:   diarrhea     Order Specific Question:   Did you call or speak to the consulting provider?      Answer:   No     Order Specific Question:   Consult To     Answer:   GI    ENTERIC CONTACT ISOLATION     Standing Status:   Standing     Number of Occurrences:   1    OT--EVAL, DEVISE PLAN OF CARE AND TREAT     Standing Status:   Standing     Number of Occurrences:   1    PT--EVAL, DEVISE PLAN OF CARE AND TREAT     Standing Status:   Standing     Number of Occurrences:   1    EKG, 12 LEAD, INITIAL     Standing Status:   Standing     Number of Occurrences:   1     Order Specific Question:   Reason for Exam:     Answer:   abd pain    FALL PRECAUTIONS     Standing Status:   Standing     Number of Occurrences:   1    ASPIRATION PRECAUTIONS     Standing Status:   Standing     Number of Occurrences:   1    DISCONTD: lactated ringers bolus infusion 500 mL    lactated ringers bolus infusion 1,000 mL    ondansetron (ZOFRAN ODT) tablet 4 mg    sodium chloride (NS) flush 5-40 mL    sodium chloride (NS) flush 5-40 mL    OR Linked Order Group     acetaminophen (TYLENOL) tablet 650 mg     acetaminophen (TYLENOL) suppository 650 mg    bisacodyL (DULCOLAX) suppository 10 mg    OR Linked Order Group     promethazine (PHENERGAN) tablet 12.5 mg     ondansetron (ZOFRAN) injection 4 mg    heparin (porcine) injection 5,000 Units    0.9% sodium chloride infusion    levothyroxine (SYNTHROID) tablet 175 mcg     OP SIG:Take 175 mcg by mouth Daily (before breakfast).  metoprolol tartrate (LOPRESSOR) tablet 25 mg     OP SIG:Take 1 Tab by mouth.  atorvastatin (LIPITOR) tablet 80 mg     OP SIG:Take 1 Tablet by mouth nightly.  clopidogreL (PLAVIX) tablet 75 mg     OP SIG:Take 1 Tablet by mouth daily.  gabapentin (NEURONTIN) capsule 100 mg     OP SIG:Take 300 mg by mouth two (2) times a day.  metroNIDAZOLE (FLAGYL) IVPB premix 500 mg     Order Specific Question:   Antibiotic Indications     Answer: Other     Order Specific Question:   Other Abx Indication     Answer:   empric treatment    pantoprazole (PROTONIX) tablet 40 mg     Order Specific Question:   PPI INDICATION     Answer:   Symptomatic GERD    pantoprazole (PROTONIX) 40 mg tablet     Sig: Take 40 mg by mouth daily.     cyanocobalamin/folic ac/vit B6 (FOLIC ACID-VIT W5-FCK E72 PO)     Sig: Take  by mouth.  cholecalciferol (VITAMIN D3) (5000 Units/125 mcg) tab tablet     Sig: Take 5,000 Units by mouth daily.  ferrous sulfate (FeroSuL) 325 mg (65 mg iron) tablet     Sig: Take 325 mg by mouth Daily (before breakfast).  INITIAL PHYSICIAN ORDER: INPATIENT Medical; No; 3. Patient receiving treatment that can only be provided in an inpatient setting (further clarification in H&P documentation)     Standing Status:   Standing     Number of Occurrences:   1     Order Specific Question:   Status: Answer:   INPATIENT [101]     Order Specific Question:   Type of Bed     Answer:   Medical [8]     Order Specific Question:   Cardiac Monitoring Required? Answer:   No     Order Specific Question:   Inpatient Hospitalization Certified Necessary for the Following Reasons     Answer:   3. Patient receiving treatment that can only be provided in an inpatient setting (further clarification in H&P documentation)     Order Specific Question:   Admitting Diagnosis     Answer:   Acute kidney failure [facility]) [[id number]]     Order Specific Question:   Admitting Physician     Answer:   Nigel Collins [[id number]]     Order Specific Question:   Attending Physician     Answer:   Nigel Collins [[id number]]     Order Specific Question:   Estimated Length of Stay     Answer:   2 Midnights     Order Specific Question:   Discharge Plan:     Answer:   Home with Office Follow-up    IP CONSULT TO CASE MANAGEMENT     Standing Status:   Standing     Number of Occurrences:   1     Order Specific Question:   Reasons for Consult: Answer:    Other (Comment)       Labs -     Recent Results (from the past 12 hour(s))   CBC WITH AUTOMATED DIFF    Collection Time: 06/27/22 10:45 AM   Result Value Ref Range    WBC 9.7 4.6 - 13.2 K/uL    RBC 5.58 4.35 - 5.65 M/uL    HGB 15.3 13.0 - 16.0 g/dL    HCT 47.7 36.0 - 48.0 %    MCV 85.5 78.0 - 100.0 FL    MCH 27.4 24.0 - 34.0 PG    MCHC 32.1 31.0 - 37.0 g/dL    RDW 15.1 (H) 11.6 - 14.5 %    PLATELET 467 (H) 490 - 420 K/uL    MPV 9.3 9.2 - 11.8 FL    NRBC 0.0 0  WBC    ABSOLUTE NRBC 0.00 0.00 - 0.01 K/uL    NEUTROPHILS 81 (H) 40 - 73 %    LYMPHOCYTES 10 (L) 21 - 52 %    MONOCYTES 8 3 - 10 %    EOSINOPHILS 0 0 - 5 %    BASOPHILS 0 0 - 2 %    IMMATURE GRANULOCYTES 1 (H) 0.0 - 0.5 %    ABS. NEUTROPHILS 7.9 1.8 - 8.0 K/UL    ABS. LYMPHOCYTES 1.0 0.9 - 3.6 K/UL    ABS. MONOCYTES 0.8 0.05 - 1.2 K/UL    ABS. EOSINOPHILS 0.0 0.0 - 0.4 K/UL    ABS. BASOPHILS 0.0 0.0 - 0.1 K/UL    ABS. IMM. GRANS. 0.1 (H) 0.00 - 0.04 K/UL    DF AUTOMATED     METABOLIC PANEL, BASIC    Collection Time: 06/27/22 10:45 AM   Result Value Ref Range    Sodium 138 136 - 145 mmol/L    Potassium 4.3 3.5 - 5.5 mmol/L    Chloride 106 100 - 111 mmol/L    CO2 24 21 - 32 mmol/L    Anion gap 8 3.0 - 18 mmol/L    Glucose 144 (H) 74 - 99 mg/dL    BUN 33 (H) 7.0 - 18 MG/DL    Creatinine 2.25 (H) 0.6 - 1.3 MG/DL    BUN/Creatinine ratio 15 12 - 20      GFR est AA 34 (L) >60 ml/min/1.73m2    GFR est non-AA 28 (L) >60 ml/min/1.73m2    Calcium 10.3 (H) 8.5 - 10.1 MG/DL   LIPASE    Collection Time: 06/27/22 10:45 AM   Result Value Ref Range    Lipase 156 73 - 393 U/L   HEPATIC FUNCTION PANEL    Collection Time: 06/27/22 10:45 AM   Result Value Ref Range    Protein, total 9.0 (H) 6.4 - 8.2 g/dL    Albumin 4.4 3.4 - 5.0 g/dL    Globulin 4.6 (H) 2.0 - 4.0 g/dL    A-G Ratio 1.0 0.8 - 1.7      Bilirubin, total 0.4 0.2 - 1.0 MG/DL    Bilirubin, direct 0.2 0.0 - 0.2 MG/DL    Alk.  phosphatase 208 (H) 45 - 117 U/L    AST (SGOT) 36 10 - 38 U/L    ALT (SGPT) 38 16 - 61 U/L   LACTIC ACID    Collection Time: 06/27/22 10:45 AM   Result Value Ref Range    Lactic acid 1.4 0.4 - 2.0 MMOL/L   TSH 3RD GENERATION    Collection Time: 06/27/22 10:45 AM   Result Value Ref Range    TSH 0.01 (L) 0.36 - 3.74 uIU/mL   EKG, 12 LEAD, INITIAL    Collection Time: 06/27/22 10:49 AM   Result Value Ref Range    Ventricular Rate 100 BPM    Atrial Rate 100 BPM    P-R Interval 132 ms    QRS Duration 84 ms    Q-T Interval 334 ms    QTC Calculation (Bezet) 430 ms    Calculated P Axis 61 degrees    Calculated R Axis 68 degrees    Calculated T Axis 41 degrees    Diagnosis       Normal sinus rhythm  Normal ECG  When compared with ECG of 26-NOV-2015 00:53,  Vent. rate has increased BY  34 BPM  Nonspecific T wave abnormality, improved in Inferior leads     WBC, STOOL    Collection Time: 06/27/22  1:15 PM   Result Value Ref Range    White blood cells, stool NO WBC'S SEEN /HPF       Radiologic Studies -   CT ABD PELV WO CONT   Final Result      No acute intra-abdominal pathology identified. XR CHEST PORT   Final Result      Stable chest again demonstrating prominent scar left upper lobe with distortion   of the adjacent pulmonary vasculature. No acute process. CT Results  (Last 48 hours)               06/27/22 1334  CT ABD PELV WO CONT Final result    Impression:      No acute intra-abdominal pathology identified. Narrative:  EXAM: CT of the abdomen and pelvis       INDICATION: Abdominal pain. COMPARISON: None. TECHNIQUE: Axial CT imaging of the abdomen and pelvis was performed without   intravenous contrast. Multiplanar reformats were generated. One or more dose   reduction techniques were used on this CT: automated exposure control,   adjustment of the mAs and/or kVp according to patient size, and iterative   reconstruction techniques. The specific techniques used on this CT exam have   been documented in the patient's electronic medical record. Digital Imaging and   Communications in Medicine (DICOM) format image data are available to   nonaffiliated external healthcare facilities or entities on a secure, media   free, reciprocally searchable basis with patient authorization for at least a   12-month period after this study. .       _______________       FINDINGS:       LOWER CHEST: Unremarkable.        LIVER, BILIARY: Liver is normal. No biliary dilation. Gallbladder is   unremarkable. PANCREAS: Normal.       SPLEEN: Normal.       ADRENALS: Normal.       KIDNEYS/URETERS/BLADDER: Normal.       PELVIC ORGANS: Portions obscured by metallic hip hardware artifact. Dennis Mckeon VASCULATURE: Unremarkable       LYMPH NODES: No enlarged lymph nodes. GASTROINTESTINAL TRACT: No bowel dilation or wall thickening. Left-sided   colostomy noted. BONES: No acute or aggressive osseous abnormalities identified. OTHER: None.       _______________               CXR Results  (Last 48 hours)               06/27/22 1208  XR CHEST PORT Final result    Impression:      Stable chest again demonstrating prominent scar left upper lobe with distortion   of the adjacent pulmonary vasculature. No acute process. Narrative:  A portable AP radiograph of the chest was obtained at 1202 hours:   INDICATION:  Abdominal pain, diarrhea, dehydration. COMPARISON:  1/17/2022. FINDINGS:        Heart and mediastinum: Unremarkable. Lungs and pleura: There is a prominent thick band of probable scar in the left   upper lobe extending to the left hilum similar to the prior study. There is no   new consolidation, pleural effusion, or pneumothorax. Aorta: Unremarkable. Bones: Degenerative changes are seen in the spine. Other: None. Disposition       Disposition:  Admit    CLINICAL IMPRESSION:    1. Diarrhea, unspecified type    2. Left-sided weakness    3. Acute renal failure, unspecified acute renal failure type ([address]    4. Colostomy in place [facility])        It should be noted that I will be the provider of record for this patient  Blue Marroquin MD    Follow-up Information    None         Current Discharge Medication List          Please note that this dictation was completed with [employer], the [employer] voice recognition software.   Quite often unanticipated grammatical, syntax, homophones, and other interpretive errors are inadvertently transcribed by the computer software. Please disregard these errors. Please excuse any errors that have escaped final proofreading.

## 2022-06-27 NOTE — PROGRESS NOTES
1940 - Assumed care at this time. 2003 - Assessment completed at this time. Pt A&OxRA, on clear. Denies chest pain/SOB. Lung sounds clear. Denies any acute pain. No concerns voiced. Telephone and call bell within reach, bed in lowest position. Pt encouraged to call for assistance.

## 2022-06-27 NOTE — ED TRIAGE NOTES
Pt arrive to ed via ems with c/o diarrhea, abdominal pain, and dehydration. Per report from pt, symptoms started Friday. Pt report colostomy bag having clear liquid. Loose stool noted in colostomy bag at this time. PMHX of stroke with left sided deficit.

## 2022-06-27 NOTE — Clinical Note
Status[de-identified] INPATIENT [101]   Type of Bed: Medical [8]   Cardiac Monitoring Required?: No   Inpatient Hospitalization Certified Necessary for the Following Reasons: 3.  Patient receiving treatment that can only be provided in an inpatient setting (further clarification in H&P documentation)   Admitting Diagnosis: Acute kidney failure [facility] [[id number]]   Admitting Physician: Chris Amado [[id number]]   Attending Physician: Chris Amado [[id number]]   Estimated Length of Stay: 2 Midnights   Discharge Plan[de-identified] Home with Office Follow-up

## 2022-06-27 NOTE — H&P
History & Physical    Patient: Flavio Mcdonald MRN: [id number]  CSN: [id number]    YOB: 1935  Age: 82 y.o. Sex: male      DOA: 6/27/2022  Primary Care Provider:  Kel Jean MD      Assessment/Plan     Hospital Problems  Date Reviewed: 1/17/2022          Codes Class Noted POA    * (Principal) Acute kidney failure [facility]) ICD-10-CM: N17.9  ICD-9-CM: 584.9  6/27/2022 Unknown        History of CVA (cerebrovascular accident) ICD-10-CM: Z86.73  ICD-9-CM: V12.54  6/27/2022 Unknown        Left-sided weakness ICD-10-CM: R53.1  ICD-9-CM: 728.87  6/27/2022 Unknown        Dehydration ICD-10-CM: E86.0  ICD-9-CM: 276.51  6/27/2022 Unknown        Diarrhea ICD-10-CM: R19.7  ICD-9-CM: 787.91  6/27/2022 Unknown        S/P colectomy ICD-10-CM: Z90.49  ICD-9-CM: V45.89  6/27/2022 Unknown        Thyroid disease ICD-10-CM: E07.9  ICD-9-CM: 246. 9  Unknown Unknown    Overview Signed 6/27/2022  4:51 PM by Jorge Whitehead MD     cancer- twice             Hypertension ICD-10-CM: I10  ICD-9-CM: 401.9  Unknown Yes              Flavio Mcdonald is a 82 y.o. male with hx of cva with left side weakness, HTN , hx  colectomy presented to ER due diarrhea. he is admitted for dehydration, kolton and diarrhea     Admit to medical       kolton   Due to dehydration   Continue iv hydration   Received one L fluid in ER   Start po   Due to GI loss   Renal dose medication   Hold lisinopril   Decrease neurotin for renal dose     Diarrhea  And dehydration   Put on flagyl   Stool study including c diff r/o   Monitoring electrolytes   F.u with cx ,   Iv hydration     Left side weakness, due to hx of cva   Continue statin and plavix, not sure on  Aspirin , need verify medication    HTN   Continue metoprolol. Hold lisinopril   Will start norvasc as needed     Hypothyroidism-History of thyroid cancer-thyrotomy   Continue Synthroid         AC:  I have had a discussion with patient  regarding code status.  Particularly, described potential options in event of cardiac or respiratory arrest. I have explained what being full code entails, including cardiorespiratory resuscitation attempts with chest compressions, potential cariodioversion/ \" shocks\" as well as intubation. I have also explained Do not resuscitate which would mean we allow a natural death with out aggressive interventions full code . AC 32 min     Please note that this dictation was completed with Weesh, the computer voice recognition software. Quite often unanticipated grammatical, syntax, homophones, and other interpretive errors are inadvertently transcribed by the computer software. Please disregard these errors. Please excuse any errors that have escaped final proofreading    Estimate  length of stay : 2-3 day    DVT : heparin ppi proph  CC: a lot of stool in the bag        HPI:     Cassidy Kang is a 82 y.o. male with hx of cva with left side weakness, HTN , hx  colectomy presented to ER due diarrhea. He had some n/v for two days, then having diarrhea. He has to change his bag\"20 times\"for these two days. Now no n/v. Associated with abdominal pain. CT abdomen was done in ER, no acute process. He denies any fever no chills. Denies any recent antibiotics use. C. difficile has been sent with other stool started. He had history of stroke with left-sided weakness no changes. Denies any COVID 19 exposure. cr 2.25 . Denies any slurred speech/headache/cp/n/v/blurred vission/d/c/palpitation/gait change/bleeding. Denies smoking/ any alcohol or drug use.        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    Cancer [facility])     prostate and thyroid    Chest pain, precordial     Dementia ([facility] [address]     DJD (degenerative joint disease)  Hyperlipidemia     Hypertension     Stroke ([facility] [address]     Syncope     Thyroid disease     cancer- twice       Past Surgical History:   Procedure Laterality Date    HX CARPAL TUNNEL RELEASE      twice    HX HIP REPLACEMENT      two    HX ORTHOPAEDIC      bilateral hip surgery    HX PROSTATECTOMY      cancer    HX THORACOTOMY      dx:  amyloidosis    GA REPAIR ROTATOR CUFF,CHRONIC      three times    THYROIDECTOMY      cancer     Family History      Medical History Relation Name Comments   Glaucoma Neg Hx        Macular degeneration Neg Hx          Social History     Socioeconomic History    Marital status:    Tobacco Use    Smoking status: Never Smoker    Smokeless tobacco: Never Used   Substance and Sexual Activity    Alcohol use: No    Drug use: No       Prior to Admission medications    Medication Sig Start Date End Date Taking? Authorizing Provider   atorvastatin (LIPITOR) 80 mg tablet Take 1 Tablet by mouth nightly. 1/19/22   Nadia Mcadams MD   clopidogreL (PLAVIX) 75 mg tab Take 1 Tablet by mouth daily. 1/20/22   Nadia Mcadams MD   docusate sodium (COLACE) 100 mg capsule Take 100 mg by mouth two (2) times a day. 3/12/20   Provider, Historical   gabapentin (NEURONTIN) 300 mg capsule Take 300 mg by mouth two (2) times a day. 3/12/20   Provider, Historical   omega 3-dha-epa-fish oil 60- mg cap Take 500 mg by mouth. Provider, Historical   ascorbic acid, vitamin C, (VITAMIN C) 100 mg tab VITAMIN C TABS 8/10/16   Provider, Historical   metoprolol tartrate (LOPRESSOR) 25 mg tablet Take 1 Tab by mouth. 4/16/18   Provider, Historical   traZODone (DESYREL) 150 mg tablet  1/13/20   Provider, Historical   co-enzyme Q-10 (CO Q-10) 100 mg capsule  6/28/19   Provider, Historical   lisinopril (PRINIVIL, ZESTRIL) 20 mg tablet Take  by mouth daily. Provider, Historical   multivitamin (ONE A DAY) tablet Take 1 Tab by mouth daily.     Provider, Historical   aspirin 81 mg chewable tablet Take 81 mg by mouth daily. Provider, Historical   AMLODIPINE BESYLATE (NORVASC PO) Take  by mouth. Other, MD Carrington   levothyroxine (LEVOTHROID) 175 mcg tablet Take 175 mcg by mouth Daily (before breakfast). Mello, MD Carrington       Allergies   Allergen Reactions    Morphine Other (comments)     \"goes crazy\"       Review of Systems  Gen: No fever, chills, malaise, weight loss/gain. Heent: No headache, rhinorrhea, epistaxis, ear pain, hearing loss, sinus pain, neck pain/stiffness, sore throat. Heart: No chest pain, palpitations, KOVACS, pnd, or orthopnea. Resp: No cough, hemoptysis, wheezing and shortness of breath. GI: +nausea, + vomiting, +diarrhea, + abdomen pain, no  constipation, melena or hematochezia. : No urinary obstruction, dysuria or hematuria. Derm: No rash, new skin lesion or pruritis. Musc/skeletal: no bone or joint complains. Vasc: No edema, cyanosis or claudication. Endo: No heat/cold intolerance, no polyuria,polydipsia or polyphagia. Neuro: left side  Weakness from old stroke, No seizures. Heme: No easy bruising or bleeding. Physical Exam:     Physical Exam:  Visit Vitals  /65 (BP 1 Location: Right upper arm, BP Patient Position: At rest)   Pulse 98   Temp 97.8 °F (36.6 °C)   Resp 18   Ht 5' 5\" (1.651 m)   Wt 63.5 kg (140 lb)   SpO2 98%   BMI 23.30 kg/m²      O2 Device: None (Room air)    Temp (24hrs), Av.8 °F (36.6 °C), Min:97.8 °F (36.6 °C), Max:97.8 °F (36.6 °C)    No intake/output data recorded. No intake/output data recorded. General:  Awake, cooperative, no distress. Head:  Normocephalic, without obvious abnormality, atraumatic. Eyes:  Conjunctivae/corneas clear, sclera anicteric, PERRL, EOMs intact. Nose: Nares normal. No drainage or sinus tenderness. Throat: Lips, mucosa, and tongue normal. .   Neck: Supple, symmetrical, trachea midline, no adenopathy. Lungs:   Clear to auscultation bilaterally.        Heart:  Regular rate and rhythm, S1, S2 normal, no murmur, click, rub or gallop. Abdomen: Soft, non-tender. Bowel sounds normal. No masses,  No organomegaly. Colectomy bag noted with liquid stool , surgical scar noted    Extremities: Extremities normal, atraumatic, no cyanosis or edema. Pulses: 2+ and symmetric all extremities. Skin: Skin color-pink, texture, turgor normal. No rashes or lesions. Capillary refill normal    Neurologic: CNII-XII intact.  No focal motor or sensory deficit except left side weakness        Labs Reviewed:    BMP:   Lab Results   Component Value Date/Time     06/27/2022 10:45 AM    K 4.3 06/27/2022 10:45 AM     06/27/2022 10:45 AM    CO2 24 06/27/2022 10:45 AM    AGAP 8 06/27/2022 10:45 AM     (H) 06/27/2022 10:45 AM    BUN 33 (H) 06/27/2022 10:45 AM    CREA 2.25 (H) 06/27/2022 10:45 AM    GFRAA 34 (L) 06/27/2022 10:45 AM    GFRNA 28 (L) 06/27/2022 10:45 AM     CMP:   Lab Results   Component Value Date/Time     06/27/2022 10:45 AM    K 4.3 06/27/2022 10:45 AM     06/27/2022 10:45 AM    CO2 24 06/27/2022 10:45 AM    AGAP 8 06/27/2022 10:45 AM     (H) 06/27/2022 10:45 AM    BUN 33 (H) 06/27/2022 10:45 AM    CREA 2.25 (H) 06/27/2022 10:45 AM    GFRAA 34 (L) 06/27/2022 10:45 AM    GFRNA 28 (L) 06/27/2022 10:45 AM    CA 10.3 (H) 06/27/2022 10:45 AM    ALB 4.4 06/27/2022 10:45 AM    TP 9.0 (H) 06/27/2022 10:45 AM    GLOB 4.6 (H) 06/27/2022 10:45 AM    AGRAT 1.0 06/27/2022 10:45 AM    ALT 38 06/27/2022 10:45 AM     CBC:   Lab Results   Component Value Date/Time    WBC 9.7 06/27/2022 10:45 AM    HGB 15.3 06/27/2022 10:45 AM    HCT 47.7 06/27/2022 10:45 AM     (H) 06/27/2022 10:45 AM     All Cardiac Markers in the last 24 hours: No results found for: CPK, CK, CKMMB, CKMB, RCK3, CKMBT, CKNDX, CKND1, ROXANA, TROPT, TROIQ, ABHILASH, TROPT, TNIPOC, BNP, BNPP  Recent Glucose Results:   Lab Results   Component Value Date/Time     (H) 06/27/2022 10:45 AM     ABG: No results found for: PH, PHI, PCO2, PCO2I, PO2, PO2I, HCO3, HCO3I, FIO2, FIO2I  COAGS: No results found for: APTT, PTP, INR, INREXT, INREXT  Liver Panel:   Lab Results   Component Value Date/Time    ALB 4.4 06/27/2022 10:45 AM    CBIL 0.2 06/27/2022 10:45 AM    TP 9.0 (H) 06/27/2022 10:45 AM    GLOB 4.6 (H) 06/27/2022 10:45 AM    AGRAT 1.0 06/27/2022 10:45 AM    ALT 38 06/27/2022 10:45 AM     (H) 06/27/2022 10:45 AM     Pancreatic Markers:   Lab Results   Component Value Date/Time    LPSE 156 06/27/2022 10:45 AM       XR FEMUR LT 2 V    Result Date: 6/8/2022  EXAM: XR FEMUR LT 2 V CLINICAL INDICATION/HISTORY: prostate cancer, vascular study to go along with   > Additional: None. COMPARISON: None. TECHNIQUE: 2 views of the left femur. _______________ FINDINGS: BONES: Prior left total hip arthroplasty without evidence of hardware-related complication. No evidence of acute fracture or dislocation. No aggressive appearing osseous lytic or blastic lesion. SOFT TISSUES: Unremarkable. _______________     No evidence of acute fracture or dislocation. CT ABD PELV WO CONT    Result Date: 6/27/2022  EXAM: CT of the abdomen and pelvis INDICATION: Abdominal pain. COMPARISON: None. TECHNIQUE: Axial CT imaging of the abdomen and pelvis was performed without intravenous contrast. Multiplanar reformats were generated. One or more dose reduction techniques were used on this CT: automated exposure control, adjustment of the mAs and/or kVp according to patient size, and iterative reconstruction techniques. The specific techniques used on this CT exam have been documented in the patient's electronic medical record. Digital Imaging and Communications in Medicine (DICOM) format image data are available to nonaffiliated external healthcare facilities or entities on a secure, media free, reciprocally searchable basis with patient authorization for at least a 12-month period after this study. . _______________ FINDINGS: LOWER CHEST: Unremarkable. LIVER, BILIARY: Liver is normal. No biliary dilation. Gallbladder is unremarkable. PANCREAS: Normal. SPLEEN: Normal. ADRENALS: Normal. KIDNEYS/URETERS/BLADDER: Normal. PELVIC ORGANS: Portions obscured by metallic hip hardware artifact. Ross Rolando VASCULATURE: Unremarkable LYMPH NODES: No enlarged lymph nodes. GASTROINTESTINAL TRACT: No bowel dilation or wall thickening. Left-sided colostomy noted. BONES: No acute or aggressive osseous abnormalities identified. OTHER: None. _______________     No acute intra-abdominal pathology identified. XR CHEST PORT    Result Date: 6/27/2022  A portable AP radiograph of the chest was obtained at 1202 hours: INDICATION:  Abdominal pain, diarrhea, dehydration. COMPARISON:  1/17/2022. FINDINGS:  Heart and mediastinum: Unremarkable. Lungs and pleura: There is a prominent thick band of probable scar in the left upper lobe extending to the left hilum similar to the prior study. There is no new consolidation, pleural effusion, or pneumothorax. Aorta: Unremarkable. Bones: Degenerative changes are seen in the spine. Other: None. Stable chest again demonstrating prominent scar left upper lobe with distortion of the adjacent pulmonary vasculature. No acute process. DUPLEX LOWER EXT VENOUS BILAT    Result Date: 6/8/2022  · No evidence of deep vein thrombosis in the left lower extremity. · No evidence of deep vein thrombosis in the right lower extremity.       Procedures/imaging: see electronic medical records for all procedures/Xrays and details which were not copied into this note but were reviewed prior to creation of Terrie Pereira MD, Internal Medicine     CC: Kari Franklin MD

## 2022-06-28 LAB
ANION GAP SERPL CALC-SCNC: 5 MMOL/L (ref 3–18)
APPEARANCE UR: CLEAR
BACTERIA URNS QL MICRO: ABNORMAL /HPF
BASOPHILS # BLD: 0 K/UL (ref 0–0.1)
BASOPHILS NFR BLD: 0 % (ref 0–2)
BILIRUB UR QL: NEGATIVE
BUN SERPL-MCNC: 37 MG/DL (ref 7–18)
BUN/CREAT SERPL: 19 (ref 12–20)
C DIFF GDH STL QL: NEGATIVE
C DIFF TOX A+B STL QL IA: NEGATIVE
CALCIUM SERPL-MCNC: 8.9 MG/DL (ref 8.5–10.1)
CAMPYLOBACTER SPECIES, DNA: NEGATIVE
CHLORIDE SERPL-SCNC: 109 MMOL/L (ref 100–111)
CO2 SERPL-SCNC: 23 MMOL/L (ref 21–32)
COLOR UR: YELLOW
CREAT SERPL-MCNC: 2 MG/DL (ref 0.6–1.3)
DIFFERENTIAL METHOD BLD: ABNORMAL
ENTEROTOXIGEN E COLI, DNA: NEGATIVE
EOSINOPHIL # BLD: 0.2 K/UL (ref 0–0.4)
EOSINOPHIL NFR BLD: 3 % (ref 0–5)
EPITH CASTS URNS QL MICRO: NEGATIVE /LPF (ref 0–5)
ERYTHROCYTE [DISTWIDTH] IN BLOOD BY AUTOMATED COUNT: 15.1 % (ref 11.6–14.5)
GLUCOSE SERPL-MCNC: 103 MG/DL (ref 74–99)
GLUCOSE UR STRIP.AUTO-MCNC: NEGATIVE MG/DL
HCT VFR BLD AUTO: 40.4 % (ref 36–48)
HGB BLD-MCNC: 13 G/DL (ref 13–16)
HGB UR QL STRIP: NEGATIVE
HYALINE CASTS URNS QL MICRO: ABNORMAL /LPF (ref 0–2)
IMM GRANULOCYTES # BLD AUTO: 0 K/UL (ref 0–0.04)
IMM GRANULOCYTES NFR BLD AUTO: 0 % (ref 0–0.5)
INTERPRETATION: NORMAL
KETONES UR QL STRIP.AUTO: ABNORMAL MG/DL
LEUKOCYTE ESTERASE UR QL STRIP.AUTO: ABNORMAL
LYMPHOCYTES # BLD: 1.4 K/UL (ref 0.9–3.6)
LYMPHOCYTES NFR BLD: 24 % (ref 21–52)
MAGNESIUM SERPL-MCNC: 2.5 MG/DL (ref 1.6–2.6)
MCH RBC QN AUTO: 27.8 PG (ref 24–34)
MCHC RBC AUTO-ENTMCNC: 32.2 G/DL (ref 31–37)
MCV RBC AUTO: 86.3 FL (ref 78–100)
MONOCYTES # BLD: 1 K/UL (ref 0.05–1.2)
MONOCYTES NFR BLD: 18 % (ref 3–10)
NEUTS SEG # BLD: 3.1 K/UL (ref 1.8–8)
NEUTS SEG NFR BLD: 54 % (ref 40–73)
NITRITE UR QL STRIP.AUTO: NEGATIVE
NRBC # BLD: 0 K/UL (ref 0–0.01)
NRBC BLD-RTO: 0 PER 100 WBC
P SHIGELLOIDES DNA STL QL NAA+PROBE: NEGATIVE
PH UR STRIP: 5 [PH] (ref 5–8)
PLATELET # BLD AUTO: 377 K/UL (ref 135–420)
PMV BLD AUTO: 9.3 FL (ref 9.2–11.8)
POTASSIUM SERPL-SCNC: 4.1 MMOL/L (ref 3.5–5.5)
PROT UR STRIP-MCNC: ABNORMAL MG/DL
RBC # BLD AUTO: 4.68 M/UL (ref 4.35–5.65)
RBC #/AREA URNS HPF: ABNORMAL /HPF (ref 0–5)
SALMONELLA SPECIES, DNA: NEGATIVE
SHIGA TOXIN PRODUCING, DNA: NEGATIVE
SHIGELLA SP+EIEC IPAH STL QL NAA+PROBE: NEGATIVE
SODIUM SERPL-SCNC: 137 MMOL/L (ref 136–145)
SP GR UR REFRACTOMETRY: 1.02 (ref 1–1.03)
UROBILINOGEN UR QL STRIP.AUTO: 0.2 EU/DL (ref 0.2–1)
VIBRIO SPECIES, DNA: NEGATIVE
WBC # BLD AUTO: 5.7 K/UL (ref 4.6–13.2)
WBC URNS QL MICRO: ABNORMAL /HPF (ref 0–5)
Y. ENTEROCOLITICA, DNA: NEGATIVE

## 2022-06-28 PROCEDURE — 74011000250 HC RX REV CODE- 250: Performed by: HOSPITALIST

## 2022-06-28 PROCEDURE — 80048 BASIC METABOLIC PNL TOTAL CA: CPT

## 2022-06-28 PROCEDURE — 81001 URINALYSIS AUTO W/SCOPE: CPT

## 2022-06-28 PROCEDURE — 85025 COMPLETE CBC W/AUTO DIFF WBC: CPT

## 2022-06-28 PROCEDURE — 97166 OT EVAL MOD COMPLEX 45 MIN: CPT

## 2022-06-28 PROCEDURE — 36415 COLL VENOUS BLD VENIPUNCTURE: CPT

## 2022-06-28 PROCEDURE — 74011250637 HC RX REV CODE- 250/637: Performed by: HOSPITALIST

## 2022-06-28 PROCEDURE — 83735 ASSAY OF MAGNESIUM: CPT

## 2022-06-28 PROCEDURE — 65270000029 HC RM PRIVATE

## 2022-06-28 PROCEDURE — 97530 THERAPEUTIC ACTIVITIES: CPT

## 2022-06-28 PROCEDURE — 97162 PT EVAL MOD COMPLEX 30 MIN: CPT

## 2022-06-28 PROCEDURE — 74011250636 HC RX REV CODE- 250/636: Performed by: HOSPITALIST

## 2022-06-28 PROCEDURE — 97116 GAIT TRAINING THERAPY: CPT

## 2022-06-28 RX ORDER — HYDROCODONE BITARTRATE AND ACETAMINOPHEN 5; 325 MG/1; MG/1
1 TABLET ORAL
Status: DISCONTINUED | OUTPATIENT
Start: 2022-06-28 | End: 2022-06-29 | Stop reason: HOSPADM

## 2022-06-28 RX ORDER — DICYCLOMINE HYDROCHLORIDE 10 MG/1
10 CAPSULE ORAL 3 TIMES DAILY
Status: DISCONTINUED | OUTPATIENT
Start: 2022-06-28 | End: 2022-06-29 | Stop reason: HOSPADM

## 2022-06-28 RX ORDER — ASPIRIN 325 MG
500 TABLET, DELAYED RELEASE (ENTERIC COATED) ORAL DAILY
Status: DISCONTINUED | OUTPATIENT
Start: 2022-06-29 | End: 2022-06-29 | Stop reason: HOSPADM

## 2022-06-28 RX ORDER — HYDROCODONE BITARTRATE AND ACETAMINOPHEN 5; 325 MG/1; MG/1
1 TABLET ORAL
COMMUNITY

## 2022-06-28 RX ORDER — GUAIFENESIN 100 MG/5ML
81 LIQUID (ML) ORAL DAILY
Status: DISCONTINUED | OUTPATIENT
Start: 2022-06-29 | End: 2022-06-29 | Stop reason: HOSPADM

## 2022-06-28 RX ADMIN — LEVOTHYROXINE SODIUM 175 MCG: 0.1 TABLET ORAL at 06:52

## 2022-06-28 RX ADMIN — SODIUM CHLORIDE 75 ML/HR: 9 INJECTION, SOLUTION INTRAVENOUS at 09:15

## 2022-06-28 RX ADMIN — GABAPENTIN 100 MG: 100 CAPSULE ORAL at 09:20

## 2022-06-28 RX ADMIN — METRONIDAZOLE 500 MG: 500 INJECTION, SOLUTION INTRAVENOUS at 16:41

## 2022-06-28 RX ADMIN — DICYCLOMINE HYDROCHLORIDE 10 MG: 10 CAPSULE ORAL at 21:58

## 2022-06-28 RX ADMIN — METRONIDAZOLE 500 MG: 500 INJECTION, SOLUTION INTRAVENOUS at 09:15

## 2022-06-28 RX ADMIN — HEPARIN SODIUM 5000 UNITS: 5000 INJECTION INTRAVENOUS; SUBCUTANEOUS at 09:20

## 2022-06-28 RX ADMIN — CLOPIDOGREL BISULFATE 75 MG: 75 TABLET ORAL at 12:07

## 2022-06-28 RX ADMIN — SODIUM CHLORIDE, PRESERVATIVE FREE 10 ML: 5 INJECTION INTRAVENOUS at 14:00

## 2022-06-28 RX ADMIN — METOPROLOL TARTRATE 25 MG: 25 TABLET, FILM COATED ORAL at 12:07

## 2022-06-28 RX ADMIN — GABAPENTIN 100 MG: 100 CAPSULE ORAL at 21:57

## 2022-06-28 RX ADMIN — METRONIDAZOLE 500 MG: 500 INJECTION, SOLUTION INTRAVENOUS at 00:50

## 2022-06-28 RX ADMIN — HYDROCODONE BITARTRATE AND ACETAMINOPHEN 1 TABLET: 5; 325 TABLET ORAL at 16:40

## 2022-06-28 RX ADMIN — TRAZODONE HYDROCHLORIDE 150 MG: 100 TABLET ORAL at 21:58

## 2022-06-28 RX ADMIN — PANTOPRAZOLE SODIUM 40 MG: 40 TABLET, DELAYED RELEASE ORAL at 06:52

## 2022-06-28 RX ADMIN — ATORVASTATIN CALCIUM 80 MG: 20 TABLET, FILM COATED ORAL at 21:57

## 2022-06-28 RX ADMIN — HEPARIN SODIUM 5000 UNITS: 5000 INJECTION INTRAVENOUS; SUBCUTANEOUS at 15:24

## 2022-06-28 RX ADMIN — METOPROLOL TARTRATE 25 MG: 25 TABLET, FILM COATED ORAL at 21:58

## 2022-06-28 RX ADMIN — SODIUM CHLORIDE, PRESERVATIVE FREE 10 ML: 5 INJECTION INTRAVENOUS at 22:00

## 2022-06-28 NOTE — PROGRESS NOTES
Problem: Self Care Deficits Care Plan (Adult)  Goal: *Acute Goals and Plan of Care (Insert Text)  Description: Occupational Therapy Goals  Initiated 6/28/2022 within 7 day(s). 1.  Patient will perform grooming with modified independence standing at sink. 2.  Patient will perform lower body dressing with independence. 3.  Patient will perform toilet transfers with modified independence. 4.  Patient will perform all aspects of toileting with modified independence. 5.  Patient will participate in upper extremity therapeutic exercise/activities with supervision/set-up for 10 minutes. 6.  Patient will utilize energy conservation techniques during functional activities with verbal, visual, and tactile cues. OCCUPATIONAL THERAPY EVALUATION    Patient: Flavio Mcdonald (61 y.o. male)  Date: 6/28/2022  Primary Diagnosis: Acute kidney failure ([facility] [address] [N17.9]        Precautions:   Fall  PLOF: mod I for ADLs and transfers     ASSESSMENT :  Based on the objective data described below, the patient presents with decreased strength, endurance and balance for carryover of ADLs and transfers following above mentioned medical diagnosis. Pt performed bed mobility, supine<>sit, sit<>stand transfers, toilet transfers, and LB dressing with SB-CGA during this session. Pt education on importance of continue with HEP that pt received PTA to maintain UE strength and AROM. Pt verbalized understanding for the same. Pt was left supine in bed at the end of session in NAD. Patient will benefit from skilled intervention to address the above impairments.   Patient's rehabilitation potential is considered to be Good  Factors which may influence rehabilitation potential include:   []             None noted  []             Mental ability/status  [x]             Medical condition  []             Home/family situation and support systems  []             Safety awareness  []             Pain tolerance/management  []             Other: PLAN :  Recommendations and Planned Interventions:   [x]               Self Care Training                  [x]      Therapeutic Activities  [x]               Functional Mobility Training   []      Cognitive Retraining  [x]               Therapeutic Exercises           [x]      Endurance Activities  [x]               Balance Training                    []      Neuromuscular Re-Education  []               Visual/Perceptual Training     [x]      Home Safety Training  [x]               Patient Education                   [x]      Family Training/Education  []               Other (comment):    Frequency/Duration: Patient will be followed by occupational therapy 3 times a week to address goals. Discharge Recommendations: Outpatient  Further Equipment Recommendations for Discharge: N/A     SUBJECTIVE:   Patient stated  I am doing alright. 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    Cancer ([facility] [address]     prostate and thyroid    Chest pain, precordial     Dementia ([facility])     DJD (degenerative joint disease)     Hyperlipidemia     Hypertension     Stroke ([facility] [address]     Syncope     Thyroid disease     cancer- twice     Past Surgical History:   Procedure Laterality Date    HX CARPAL TUNNEL RELEASE      twice    HX HIP REPLACEMENT      two    HX ORTHOPAEDIC      bilateral hip surgery    HX PROSTATECTOMY      cancer    HX THORACOTOMY      dx:  amyloidosis    HI REPAIR ROTATOR CUFF,CHRONIC      three times    THYROIDECTOMY      cancer     Barriers to Learning/Limitations: None  Compensate with: visual, verbal, tactile, kinesthetic cues/model    Home Situation:   Home Situation  Home Environment: Trailer/mobile home  # Steps to Enter: 4  Rails to Enter: Yes  Hand Rails : Bilateral  One/Two Story Residence: One story  Living Alone: No  Support Systems: Spouse/Significant Other  Patient Expects to be Discharged to[de-identified] Home  Current DME Used/Available at Home: [place], straight,Shower chair,Walker, rollator  Tub or Shower Type: Shower  []  Right hand dominant   []  Left hand dominant    Cognitive/Behavioral Status:  Neurologic State: Alert  Orientation Level: Oriented X4  Cognition: Appropriate for age attention/concentration; Follows commands  Safety/Judgement: Fall prevention    Skin: intact  Edema: none    Vision/Perceptual:    Tracking: Able to track stimulus in all quadrants w/o difficulty    Coordination: BUE  Coordination: Within functional limits  Fine Motor Skills-Upper: Left Intact; Right Intact    Gross Motor Skills-Upper: Left Intact; Right Intact  Balance:  Sitting: Intact  Standing: Intact; Without support  Strength: BUE  Strength: Generally decreased, functional  Tone & Sensation: BUE  Tone: Normal  Range of Motion: BUE  AROM: Generally decreased, functional  Functional Mobility and Transfers for ADLs:  Bed Mobility:  Supine to Sit: Supervision  Sit to Supine: Supervision  Transfers:  Sit to Stand: Stand-by assistance  Stand to Sit: Stand-by assistance   Toilet Transfer : Stand-by assistance  ADL Assessment:   Feeding: Independent    Oral Facial Hygiene/Grooming: Stand-by assistance    Upper Body Dressing: Supervision    Lower Body Dressing: Stand-by assistance    Toileting: Stand by assistance  ADL Intervention:  Lower Body Dressing Assistance  Dressing Assistance: Stand-by assistance  Socks: Stand-by assistance  Leg Crossed Method Used: No  Position Performed: Bending forward method;Seated edge of bed  Cues: Hannah Dahl    Cognitive Retraining  Safety/Judgement: Fall prevention  Pain:  Pain level pre-treatment: 0/10   Pain level post-treatment: 0/10   Pain Intervention(s): Medication (see MAR); Rest, Ice, Repositioning   Response to intervention: Nurse notified, See doc flow    Activity Tolerance:   Good     Please refer to the flowsheet for vital signs taken during this treatment.   After treatment:   [] Patient left in no apparent distress sitting up in chair  [x] Patient left in no apparent distress in bed  [x] Call bell left within reach  [x] Nursing notified  [] Caregiver present  [] Bed alarm activated    COMMUNICATION/EDUCATION:   [x] Role of Occupational Therapy in the acute care setting  [x] Home safety education was provided and the patient/caregiver indicated understanding. [x] Patient/family have participated as able in goal setting and plan of care. [x] Patient/family agree to work toward stated goals and plan of care. [] Patient understands intent and goals of therapy, but is neutral about his/her participation. [] Patient is unable to participate in goal setting and plan of care. Thank you for this referral.  Bianca Hong OTR/L  Time Calculation: 15 mins    Eval Complexity: History: LOW Complexity : Brief history review ; Examination: LOW Complexity : 1-3 performance deficits relating to physical, cognitive , or psychosocial skils that result in activity limitations and / or participation restrictions ; Decision Making:MEDIUM Complexity : Patient may present with comorbidities that affect occupational performnce.  Miniml to moderate modification of tasks or assistance (eg, physical or verbal ) with assesment(s) is necessary to enable patient to complete evaluation

## 2022-06-28 NOTE — ROUTINE PROCESS
Bedside and Verbal shift change report given to Beto Nguyen RN by Lulu Carolina RN. Report included the following information SBAR, Kardex, Intake/Output and MAR.

## 2022-06-28 NOTE — PROGRESS NOTES
Paged reported pt having left side pain, pt is allergic to morphine. Recommend rn to verify with pt or family for pain medication he used before. RN reported tylenol not working -pt was not given tylenol at this point.

## 2022-06-28 NOTE — PROGRESS NOTES
Patient called for pain medication. Nurse explained to the patient that the only pain medication order was tylenol patient stated \" I need something stronger than that, its not going to work\" Nurse asked the patient where is he experiencing pain patient stated \" my whole left side\" nurse told the patient she would call the doctor for an order. Nurse paged doc and made the doctor answer that the patient was in pain and did not want to the the tylenol. Md wanted the home medications updated home medication where already updated yesterday by the patients wife and admitting nurse. Md also asked was patient allergic to morphine nurse also explained to the MD that the allergy to morphine was listed in the patients chart. Md asked the nurse to call the patients wife to have her list every pain medication that this patient has taken and tolerated. Nurse attempted to call the patients wife no answer from the wife. Patient still in pain. Will continue to monitor.       [id number] Patients wife called patient back PRN PAIN MEDICATION UPDATED IN PATIENT HOME MEDICATIONS

## 2022-06-28 NOTE — PROGRESS NOTES
Has Your Skin Lesion Been Treated?: not been treated Hospitalist Progress Note-critical care note     Patient: Paul Mai MRN: [id number]  CSN: [id number]    YOB: 1935  Age: 82 y.o. Sex: male    DOA: 6/27/2022 LOS:  LOS: 1 day            Chief complaint: kolton, diarrhea left side weakness , dehydration     Assessment/Plan         Hospital Problems  Date Reviewed: 1/17/2022          Codes Class Noted POA    * (Principal) Acute kidney failure ([facility] [address] ICD-10-CM: N17.9  ICD-9-CM: 584.9  6/27/2022 Unknown        History of CVA (cerebrovascular accident) ICD-10-CM: Z86.73  ICD-9-CM: V12.54  6/27/2022 Unknown        Left-sided weakness ICD-10-CM: R53.1  ICD-9-CM: 728.87  6/27/2022 Unknown        Dehydration ICD-10-CM: E86.0  ICD-9-CM: 276.51  6/27/2022 Unknown        Diarrhea ICD-10-CM: R19.7  ICD-9-CM: 787.91  6/27/2022 Unknown        S/P colectomy ICD-10-CM: Z90.49  ICD-9-CM: V45.89  6/27/2022 Unknown        Thyroid disease ICD-10-CM: E07.9  ICD-9-CM: 246. 9  Unknown Unknown    Overview Signed 6/27/2022  4:51 PM by Guillermo Parkinson MD     cancer- twice             Hypertension ICD-10-CM: I10  ICD-9-CM: 401.9  Unknown Yes                 Paul Mai is a 82 y.o. male with hx of cva with left side weakness, HTN , hx  colectomy presented to ER due diarrhea. he is admitted for dehydration, kolton and diarrhea              kolton -mild improving   Due to dehydration   Continue iv hydration   Due to GI loss        Diarrhea  And dehydration   c diff negative    Stool study still pending   Give bentyl   Monitoring electrolytes        Left side weakness, due to hx of cva   Continue statin and plavix,   Fall precaution      HTN   Continue metoprolol. Hold lisinopril   Will start norvasc as needed      Hypothyroidism-History of thyroid cancer-thyrotomy   Continue Synthroid    Subjective: no n/v, still diarrhea-same as yesterday   Disposition :tbd,   Review of systems:    General: No fevers or chills. Cardiovascular: No chest pain or pressure. No palpitations. Is This A New Presentation, Or A Follow-Up?: Skin Lesion Pulmonary: No shortness of breath. Gastrointestinal: No nausea, vomiting. + diarrhea     Vital signs/Intake and Output:  Visit Vitals  BP (!) 148/65 (BP 1 Location: Right upper arm, BP Patient Position: Sitting)   Pulse 79   Temp 97.5 °F (36.4 °C)   Resp 18   Ht 5' 5\" (1.651 m)   Wt 62.1 kg (136 lb 14.4 oz)   SpO2 98%   BMI 22.78 kg/m²     Current Shift:  06/28 0701 - 06/28 1900  In: -   Out: 225 [Urine:225]  Last three shifts:  06/26 1901 - 06/28 0700  In: 50 [P.O.:50]  Out: 150     Physical Exam:  General: WD, WN. Alert, cooperative, no acute distress    HEENT: NC, Atraumatic. PERRLA, anicteric sclerae. Lungs: CTA Bilaterally. No Wheezing/Rhonchi/Rales. Heart:  Regular  rhythm,  No murmur, No Rubs, No Gallops  Abdomen: Soft, Non distended, Non tender. +Bowel sounds, colectomy bag liquid stool noted   Extremities: No c/c/e  Psych:   Not anxious or agitated. Neurologic:  No acute neurological deficit except left side weakness           Labs: Results:       Chemistry Recent Labs     06/28/22  0548 06/27/22  1045   * 144*    138   K 4.1 4.3    106   CO2 23 24   BUN 37* 33*   CREA 2.00* 2.25*   CA 8.9 10.3*   AGAP 5 8   BUCR 19 15   AP  --  208*   TP  --  9.0*   ALB  --  4.4   GLOB  --  4.6*   AGRAT  --  1.0      CBC w/Diff Recent Labs     06/28/22  0548 06/27/22  1045   WBC 5.7 9.7   RBC 4.68 5.58   HGB 13.0 15.3   HCT 40.4 47.7    471*   GRANS 54 81*   LYMPH 24 10*   EOS 3 0      Cardiac Enzymes No results for input(s): CPK, CKND1, ROXANA in the last 72 hours. No lab exists for component: CKRMB, TROIP   Coagulation No results for input(s): PTP, INR, APTT, INREXT in the last 72 hours.     Lipid Panel Lab Results   Component Value Date/Time    Cholesterol, total 221 (H) 01/18/2022 02:47 AM    HDL Cholesterol 58 01/18/2022 02:47 AM    LDL, calculated 147.4 (H) 01/18/2022 02:47 AM    VLDL, calculated 15.6 01/18/2022 02:47 AM    Triglyceride 78 01/18/2022 02:47 AM    CHOL/HDL Ratio 3.8 01/18/2022 02:47 AM      BNP No results for input(s): BNPP in the last 72 hours. Liver Enzymes Recent Labs     06/27/22  1045   TP 9.0*   ALB 4.4   *      Thyroid Studies Lab Results   Component Value Date/Time    TSH 0.01 (L) 06/27/2022 10:45 AM        Procedures/imaging: see electronic medical records for all procedures/Xrays and details which were not copied into this note but were reviewed prior to creation of Plan    XR FEMUR LT 2 V    Result Date: 6/8/2022  EXAM: XR FEMUR LT 2 V CLINICAL INDICATION/HISTORY: prostate cancer, vascular study to go along with   > Additional: None. COMPARISON: None. TECHNIQUE: 2 views of the left femur. _______________ FINDINGS: BONES: Prior left total hip arthroplasty without evidence of hardware-related complication. No evidence of acute fracture or dislocation. No aggressive appearing osseous lytic or blastic lesion. SOFT TISSUES: Unremarkable. _______________     No evidence of acute fracture or dislocation. CT ABD PELV WO CONT    Result Date: 6/27/2022  EXAM: CT of the abdomen and pelvis INDICATION: Abdominal pain. COMPARISON: None. TECHNIQUE: Axial CT imaging of the abdomen and pelvis was performed without intravenous contrast. Multiplanar reformats were generated. One or more dose reduction techniques were used on this CT: automated exposure control, adjustment of the mAs and/or kVp according to patient size, and iterative reconstruction techniques. The specific techniques used on this CT exam have been documented in the patient's electronic medical record. Digital Imaging and Communications in Medicine (DICOM) format image data are available to nonaffiliated external healthcare facilities or entities on a secure, media free, reciprocally searchable basis with patient authorization for at least a 12-month period after this study. . _______________ FINDINGS: LOWER CHEST: Unremarkable. LIVER, BILIARY: Liver is normal. No biliary dilation.  Gallbladder is unremarkable. PANCREAS: Normal. SPLEEN: Normal. ADRENALS: Normal. KIDNEYS/URETERS/BLADDER: Normal. PELVIC ORGANS: Portions obscured by metallic hip hardware artifact. Clenton Reas VASCULATURE: Unremarkable LYMPH NODES: No enlarged lymph nodes. GASTROINTESTINAL TRACT: No bowel dilation or wall thickening. Left-sided colostomy noted. BONES: No acute or aggressive osseous abnormalities identified. OTHER: None. _______________     No acute intra-abdominal pathology identified. XR CHEST PORT    Result Date: 6/27/2022  A portable AP radiograph of the chest was obtained at 1202 hours: INDICATION:  Abdominal pain, diarrhea, dehydration. COMPARISON:  1/17/2022. FINDINGS:  Heart and mediastinum: Unremarkable. Lungs and pleura: There is a prominent thick band of probable scar in the left upper lobe extending to the left hilum similar to the prior study. There is no new consolidation, pleural effusion, or pneumothorax. Aorta: Unremarkable. Bones: Degenerative changes are seen in the spine. Other: None. Stable chest again demonstrating prominent scar left upper lobe with distortion of the adjacent pulmonary vasculature. No acute process. DUPLEX LOWER EXT VENOUS BILAT    Result Date: 6/8/2022  · No evidence of deep vein thrombosis in the left lower extremity. · No evidence of deep vein thrombosis in the right lower extremity.         Keanu Lopez MD

## 2022-06-28 NOTE — PROGRESS NOTES
D/c plan: H2H vs home / [facility] pending medical progression. Chart Review assessment completed. Anticipate home w/ H2H vs home [facility] pending medical progression and conversation w/ pt. Reason for Admission:   Acute kidney failure ([facility] [address] [N17.9]    PCP: Keron Morgan MD    Enteric Contact  No active infections                RUR Score:     13%             Resources/supports,as identified by patient/family:   Spouse      Barriers to discharge: C-diff r/o             Plan for utilizing home health:    yes                          Likelihood of readmission:   13%         Transition of Care Plan:     [facility] vs [facility]                Initial assessment completed with past medical records. Cognitive status of patient: oriented to time, place, person and situation. Face sheet information confirmed:  No.  T  Patient is able to navigate steps as needed. Prior to hospitalization, patient was considered to be independent with ADLs/IADLS : yes . The spouse will be available to transport patient home upon discharge. Patient is not currently active with home health. IThis patient is on dialysis/days :no    The patient states that he can obtain his medications from the pharmacy, and take his medications as directed. Patient's current insurance is Payor: Lina Givens / Plan: [address] [place] [address] HMO / Product Type: Managed Care Medicare /        Care Management Interventions  PCP Verified by CM: Yes (Via chart: Dr. Patrick Vargas)  Mode of Transport at Discharge:  Other (see comment) (Spouse)  Transition of Care Consult (CM Consult):  (H2H vs home [facility])  Discharge Durable Medical Equipment: No  Physical Therapy Consult: Yes  Occupational Therapy Consult: Yes  Support Systems: Spouse/Significant Other  Confirm Follow Up Transport: Family  The Plan for Transition of Care is Related to the Following Treatment Goals : H2H vs Home [facility] pending medical progresion  Discharge Location  Patient Expects to be Discharged to[de-identified] Home

## 2022-06-28 NOTE — PROGRESS NOTES
Problem: Risk for Spread of Infection  Goal: Prevent transmission of infectious organism to others  Description: Prevent the transmission of infectious organisms to other patients, staff members, and visitors.   Outcome: Progressing Towards Goal     Problem: General Medical Care Plan  Goal: *Vital signs within specified parameters  Outcome: Progressing Towards Goal  Goal: *Labs within defined limits  Outcome: Progressing Towards Goal  Goal: *Absence of infection signs and symptoms  Outcome: Progressing Towards Goal  Goal: *Optimal pain control at patient's stated goal  Outcome: Progressing Towards Goal  Goal: *Skin integrity maintained  Outcome: Progressing Towards Goal  Goal: *Fluid volume balance  Outcome: Progressing Towards Goal  Goal: *Optimize nutritional status  Outcome: Progressing Towards Goal  Goal: *Anxiety reduced or absent  Outcome: Progressing Towards Goal  Goal: *Progressive mobility and function (eg: ADL's)  Outcome: Progressing Towards Goal

## 2022-06-28 NOTE — PROGRESS NOTES
Problem: Mobility Impaired (Adult and Pediatric)  Goal: *Acute Goals and Plan of Care (Insert Text)  Description: Physical Therapy Goals  Initiated 6/28/2022 and to be accomplished within 7 day(s)  1. Patient will move from supine to sit and sit to supine  in bed with independence. 2.  Patient will transfer from bed to chair and chair to bed with independence using the least restrictive device. 3.  Patient will perform sit to stand with modified independence. 4.  Patient will ambulate with modified independence for 300 feet with the least restrictive device. 5.  Patient will ascend/descend 4 stairs with handrail(s) with supervision/set-up. Note:   physical Therapy EVALUATION    Patient: Zuhair Carnes (5 y.o. male)  Date: 6/28/2022  Primary Diagnosis: Acute kidney failure ([facility] [address] [N17.9]  Precautions:   Fall    ASSESSMENT :  Based on the objective data described below, the patient presents with lower extremity weakness L>R, decreased gait quality and endurance, and decreased activity tolerance. Pt performed stand transfers with SBA. Patient ambulated 200 feet with quad cane, GB applied, CGA progressing to SBA. No LOB or unsteadiness noted; fatigue and mild SOB toward end of ambulation, SPO2 99% on RW, Hr 94bpm. Will continue to progress mobility as pt tolerates. Pt reports he was doing outpatient PT prior to admission and would benefit from resuming this upon d/c from hospital.    Patient will benefit from skilled intervention to address the above impairments.   Patients rehabilitation potential is considered to be Good  Factors which may influence rehabilitation potential include:   [x]         None noted  []         Mental ability/status  []         Medical condition  []         Home/family situation and support systems  []         Safety awareness  []         Pain tolerance/management  []         Other:      PLAN :  Recommendations and Planned Interventions:  [x]           Bed Mobility Training [x]    Neuromuscular Re-Education  [x]           Transfer Training                   []    Orthotic/Prosthetic Training  [x]           Gait Training                          []    Modalities  [x]           Therapeutic Exercises          []    Edema Management/Control  [x]           Therapeutic Activities            [x]    Patient and Family Training/Education  []           Other (comment):    Frequency/Duration: Patient will be followed by physical therapy 1-2 times per day to address goals. Discharge Recommendations: Outpatient Physical Therapy  Further Equipment Recommendations for Discharge: grab bars     SUBJECTIVE:   Patient stated I feel ok. 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 [address]     prostate and thyroid    Chest pain, precordial     Dementia (HCC)     DJD (degenerative joint disease)     Hyperlipidemia     Hypertension     Stroke ([facility] [address]     Syncope     Thyroid disease     cancer- twice     Past Surgical History:   Procedure Laterality Date    HX CARPAL TUNNEL RELEASE      twice    HX HIP REPLACEMENT      two    HX ORTHOPAEDIC      bilateral hip surgery    HX PROSTATECTOMY      cancer    HX THORACOTOMY      dx:  amyloidosis    AL REPAIR ROTATOR CUFF,CHRONIC      three times    THYROIDECTOMY      cancer     Barriers to Learning/Limitations: None  Compensate with: Visual Cues, Verbal Cues, and Tactile Cues  Prior Level of Function/Home Situation: Independent ambulation with quad cane  Home Situation  Home Environment: Private residence  # Steps to Enter: 4  Rails to Enter: Yes  Hand Rails : Bilateral (wide)  One/Two Story Residence: One story  Living Alone: No  Support Systems: Spouse/Significant Other  Patient Expects to be Discharged to[de-identified] Home  Current DME Used/Available at Home: [place], quad,Shower chair  Strength:    Strength: Generally decreased, functional  Tone & Sensation:   Tone: Normal  Range Of Motion:  AROM: Generally decreased, functional  Functional Mobility:  Bed Mobility:   Supine to Sit: Supervision  Sit to Supine: Supervision   Transfers:  Sit to Stand: Stand-by assistance  Stand to Sit: Stand-by assistance  Balance:   Sitting: Intact  Standing: Intact; With support  Ambulation/Gait Training:  Distance (ft): 200 Feet (ft)  Assistive Device: Gait belt;Cane, quad  Ambulation - Level of Assistance: Contact guard assistance;Stand-by assistance   Gait Description (WDL): Exceptions to WDL  Gait Abnormalities: Decreased step clearance  Base of Support: Narrowed   Speed/Kristel: Slow  Step Length: Right shortened;Left shortened  Therapeutic Exercises:   Seated there ex: ankle DF/PF, LAQ, marches  Pain:  None note  Activity Tolerance:   Good  Please refer to the flowsheet for vital signs taken during this treatment. After treatment:   []         Patient left in no apparent distress sitting up in chair  [x]         Patient left in no apparent distress in bed  [x]         Call bell left within reach  [x]         Nursing notified  []         Caregiver present  []         Bed alarm activated    COMMUNICATION/EDUCATION:   [x]         Fall prevention education was provided and the patient/caregiver indicated understanding. [x]         Patient/family have participated as able in goal setting and plan of care. [x]         Patient/family agree to work toward stated goals and plan of care. []         Patient understands intent and goals of therapy, but is neutral about his/her participation. []         Patient is unable to participate in goal setting and plan of care.     Thank you for this referral.  Frutoso Drown Titcomb   Time Calculation: 31 mins   Eval Complexity: History: MEDIUM  Complexity : 1-2 comorbidities / personal factors will impact the outcome/ POC Exam:LOW Complexity : 1-2 Standardized tests and measures addressing body structure, function, activity limitation and / or participation in recreation  Presentation: LOW Complexity : Stable, uncomplicated  Clinical Decision Making:Low Complexity    Overall Complexity:LOW

## 2022-06-29 VITALS
TEMPERATURE: 98.1 F | OXYGEN SATURATION: 96 % | RESPIRATION RATE: 18 BRPM | DIASTOLIC BLOOD PRESSURE: 68 MMHG | HEART RATE: 66 BPM | BODY MASS INDEX: 22.81 KG/M2 | HEIGHT: 65 IN | WEIGHT: 136.9 LBS | SYSTOLIC BLOOD PRESSURE: 142 MMHG

## 2022-06-29 LAB
ANION GAP SERPL CALC-SCNC: 4 MMOL/L (ref 3–18)
ATRIAL RATE: 100 BPM
BASOPHILS # BLD: 0 K/UL (ref 0–0.1)
BASOPHILS NFR BLD: 1 % (ref 0–2)
BUN SERPL-MCNC: 22 MG/DL (ref 7–18)
BUN/CREAT SERPL: 18 (ref 12–20)
CALCIUM SERPL-MCNC: 8.4 MG/DL (ref 8.5–10.1)
CALCULATED P AXIS, ECG09: 61 DEGREES
CALCULATED R AXIS, ECG10: 68 DEGREES
CALCULATED T AXIS, ECG11: 41 DEGREES
CHLORIDE SERPL-SCNC: 115 MMOL/L (ref 100–111)
CO2 SERPL-SCNC: 23 MMOL/L (ref 21–32)
CREAT SERPL-MCNC: 1.19 MG/DL (ref 0.6–1.3)
DIAGNOSIS, 93000: NORMAL
DIFFERENTIAL METHOD BLD: ABNORMAL
EOSINOPHIL # BLD: 0.2 K/UL (ref 0–0.4)
EOSINOPHIL NFR BLD: 6 % (ref 0–5)
ERYTHROCYTE [DISTWIDTH] IN BLOOD BY AUTOMATED COUNT: 14.6 % (ref 11.6–14.5)
GLUCOSE SERPL-MCNC: 106 MG/DL (ref 74–99)
HCT VFR BLD AUTO: 35.3 % (ref 36–48)
HGB BLD-MCNC: 11.7 G/DL (ref 13–16)
IMM GRANULOCYTES # BLD AUTO: 0 K/UL (ref 0–0.04)
IMM GRANULOCYTES NFR BLD AUTO: 0 % (ref 0–0.5)
LYMPHOCYTES # BLD: 1 K/UL (ref 0.9–3.6)
LYMPHOCYTES NFR BLD: 28 % (ref 21–52)
MAGNESIUM SERPL-MCNC: 2.4 MG/DL (ref 1.6–2.6)
MCH RBC QN AUTO: 27.7 PG (ref 24–34)
MCHC RBC AUTO-ENTMCNC: 33.1 G/DL (ref 31–37)
MCV RBC AUTO: 83.5 FL (ref 78–100)
MONOCYTES # BLD: 0.4 K/UL (ref 0.05–1.2)
MONOCYTES NFR BLD: 13 % (ref 3–10)
NEUTS SEG # BLD: 1.8 K/UL (ref 1.8–8)
NEUTS SEG NFR BLD: 52 % (ref 40–73)
NRBC # BLD: 0 K/UL (ref 0–0.01)
NRBC BLD-RTO: 0 PER 100 WBC
P-R INTERVAL, ECG05: 132 MS
PLATELET # BLD AUTO: 325 K/UL (ref 135–420)
PMV BLD AUTO: 8.8 FL (ref 9.2–11.8)
POTASSIUM SERPL-SCNC: 3.6 MMOL/L (ref 3.5–5.5)
Q-T INTERVAL, ECG07: 334 MS
QRS DURATION, ECG06: 84 MS
QTC CALCULATION (BEZET), ECG08: 430 MS
RBC # BLD AUTO: 4.23 M/UL (ref 4.35–5.65)
SODIUM SERPL-SCNC: 142 MMOL/L (ref 136–145)
VENTRICULAR RATE, ECG03: 100 BPM
WBC # BLD AUTO: 3.4 K/UL (ref 4.6–13.2)

## 2022-06-29 PROCEDURE — 97116 GAIT TRAINING THERAPY: CPT

## 2022-06-29 PROCEDURE — 74011250637 HC RX REV CODE- 250/637: Performed by: HOSPITALIST

## 2022-06-29 PROCEDURE — 83735 ASSAY OF MAGNESIUM: CPT

## 2022-06-29 PROCEDURE — 80048 BASIC METABOLIC PNL TOTAL CA: CPT

## 2022-06-29 PROCEDURE — 85025 COMPLETE CBC W/AUTO DIFF WBC: CPT

## 2022-06-29 PROCEDURE — 74011000250 HC RX REV CODE- 250: Performed by: HOSPITALIST

## 2022-06-29 PROCEDURE — 36415 COLL VENOUS BLD VENIPUNCTURE: CPT

## 2022-06-29 PROCEDURE — 74011250636 HC RX REV CODE- 250/636: Performed by: HOSPITALIST

## 2022-06-29 RX ORDER — METRONIDAZOLE 500 MG/1
500 TABLET ORAL 3 TIMES DAILY
Qty: 15 TABLET | Refills: 0 | Status: SHIPPED | OUTPATIENT
Start: 2022-06-29 | End: 2022-07-04

## 2022-06-29 RX ORDER — DICYCLOMINE HYDROCHLORIDE 10 MG/1
10 CAPSULE ORAL 3 TIMES DAILY
Qty: 15 CAPSULE | Refills: 0 | Status: SHIPPED | OUTPATIENT
Start: 2022-06-29 | End: 2022-07-04

## 2022-06-29 RX ADMIN — METRONIDAZOLE 500 MG: 500 INJECTION, SOLUTION INTRAVENOUS at 08:43

## 2022-06-29 RX ADMIN — HYDROCODONE BITARTRATE AND ACETAMINOPHEN 1 TABLET: 5; 325 TABLET ORAL at 00:02

## 2022-06-29 RX ADMIN — METOPROLOL TARTRATE 25 MG: 25 TABLET, FILM COATED ORAL at 08:45

## 2022-06-29 RX ADMIN — PANTOPRAZOLE SODIUM 40 MG: 40 TABLET, DELAYED RELEASE ORAL at 06:30

## 2022-06-29 RX ADMIN — HYDROCODONE BITARTRATE AND ACETAMINOPHEN 1 TABLET: 5; 325 TABLET ORAL at 08:45

## 2022-06-29 RX ADMIN — CLOPIDOGREL BISULFATE 75 MG: 75 TABLET ORAL at 08:45

## 2022-06-29 RX ADMIN — SODIUM CHLORIDE, PRESERVATIVE FREE 10 ML: 5 INJECTION INTRAVENOUS at 06:29

## 2022-06-29 RX ADMIN — METRONIDAZOLE 500 MG: 500 INJECTION, SOLUTION INTRAVENOUS at 00:02

## 2022-06-29 RX ADMIN — SODIUM CHLORIDE 75 ML/HR: 9 INJECTION, SOLUTION INTRAVENOUS at 00:02

## 2022-06-29 RX ADMIN — DICYCLOMINE HYDROCHLORIDE 10 MG: 10 CAPSULE ORAL at 08:45

## 2022-06-29 RX ADMIN — GABAPENTIN 100 MG: 100 CAPSULE ORAL at 08:45

## 2022-06-29 RX ADMIN — LEVOTHYROXINE SODIUM 175 MCG: 0.1 TABLET ORAL at 06:31

## 2022-06-29 RX ADMIN — ASPIRIN 81 MG: 81 TABLET, CHEWABLE ORAL at 08:45

## 2022-06-29 RX ADMIN — HEPARIN SODIUM 5000 UNITS: 5000 INJECTION INTRAVENOUS; SUBCUTANEOUS at 00:02

## 2022-06-29 RX ADMIN — HEPARIN SODIUM 5000 UNITS: 5000 INJECTION INTRAVENOUS; SUBCUTANEOUS at 08:44

## 2022-06-29 NOTE — PROGRESS NOTES
Problem: Mobility Impaired (Adult and Pediatric)  Goal: *Acute Goals and Plan of Care (Insert Text)  Description: Physical Therapy Goals  Initiated 6/28/2022 and to be accomplished within 7 day(s)  1. Patient will move from supine to sit and sit to supine  in bed with independence. 2.  Patient will transfer from bed to chair and chair to bed with independence using the least restrictive device. 3.  Patient will perform sit to stand with modified independence. 4.  Patient will ambulate with modified independence for 300 feet with the least restrictive device. 5.  Patient will ascend/descend 4 stairs with handrail(s) with supervision/set-up. Outcome: Progressing Towards Goal   PHYSICAL THERAPY TREATMENT    Patient: Keira Green (42 y.o. male)  Date: 6/29/2022  Diagnosis: Acute kidney failure ([facility] [address] [N17.9] Acute kidney failure ([facility] [address]    Precautions: Fall  PLOF: ambulatory with a quad cane, wife uses rollator    ASSESSMENT:  Pt standing up in room upon arrival without AD. Retrieved personal cane and ambulated 200ft in pollard, reciprocal gt pattern, steady pace, no LOB or path deviations. Negotiated 3 steps holding R hand rail. Returned to room and left sitting EOB. Progression toward goals:   []      Improving appropriately and progressing toward goals  [x]      Improving slowly and progressing toward goals  []      Not making progress toward goals and plan of care will be adjusted     PLAN:  Patient continues to benefit from skilled intervention to address the above impairments. Continue treatment per established plan of care. Discharge Recommendations:  None  Further Equipment Recommendations for Discharge:  N/A     SUBJECTIVE:   Patient stated I am going home.     OBJECTIVE DATA SUMMARY:   Critical Behavior:  Neurologic State: Alert  Orientation Level: Oriented X4  Cognition: Follows commands  Safety/Judgement: Fall prevention  Functional Mobility Training:  Bed Mobility:    Supine to Sit: Independent  Sit to Supine: Independent  Transfers:  Sit to Stand: Independent  Stand to Sit: Independent  Balance:  Sitting: Intact  Standing: Intact   Ambulation/Gait Training:  Distance (ft): 200 Feet (ft)  Assistive Device: Gait belt;Cane, quad  Ambulation - Level of Assistance: Supervision;Stand-by assistance  Stairs:  Number of Stairs Trained: 3  Stairs - Level of Assistance: Supervision  Rail Use: Right         Pain:  Pain level pre-treatment: 0/10  Pain level post-treatment: 0/10   Pain Intervention(s): Medication (see MAR); Rest, Ice, Repositioning   Response to intervention: Nurse notified, See doc flow    Activity Tolerance:   good  Please refer to the flowsheet for vital signs taken during this treatment. After treatment:   [] Patient left in no apparent distress sitting up in chair  [x] Patient left in no apparent distress in bed  [x] Call bell left within reach  [] Nursing notified  [] Caregiver present  [] Bed alarm activated  [] SCDs applied      COMMUNICATION/EDUCATION:   [x]         Role of Physical Therapy in the acute care setting. [x]         Fall prevention education was provided and the patient/caregiver indicated understanding. [x]         Patient/family have participated as able in working toward goals and plan of care. [x]         Patient/family agree to work toward stated goals and plan of care. []         Patient understands intent and goals of therapy, but is neutral about his/her participation.   []         Patient is unable to participate in stated goals/plan of care: ongoing with therapy staff.  []         Other:        Samira Culp PTA   Time Calculation: 10 mins

## 2022-06-29 NOTE — PROGRESS NOTES
D/c plan: Home w/ [address]. Pt's spouse to transport. CM met w/ pt and spouse at bedside. Discussed HH. They are both in agreement. FOC provided. Pt chose [address] Referral to be sent by CMS. Pt and spouse are also inquiring about Medicaid and resources. CM sent referral to Licha gonzalez/ [facility] for assistance. CM to complete LTSS as well and will mail a copy to the faimily.

## 2022-06-29 NOTE — ACP (ADVANCE CARE PLANNING)
Advance Care Planning   Advance Care Planning Inpatient Note  [facility] Department    Today's Date: 6/29/2022  Unit: THE [address] SURGICAL/ONCOLOGY    Received request from rounding. Upon review of chart and communication with care team, patient's decision making abilities are not in question. Patient and Spouse were present in the room during visit. Goals of ACP Conversation:  Discuss Advance Care planning documents    Health Care Decision Makers:      Primary Decision Maker: Josselin Alonso - [phone]      Summary:  Completed New Documents    Advance Care Planning Documents (Patient Wishes) on file:  Healthcare Power of /Advance Directive appointment of Health care agent  Living Will/ Advance Directive  Anatomical Gift/Organ donation     Assessment:    Ready to do it.     Interventions:  Assisted in the completion of documents according to patient's wishes at this time    Care Preferences Communicated:  No    Outcomes/Plan:  New Advance Directive completed    Chaplain Gabe on 6/29/2022 at 10:26 AM

## 2022-06-29 NOTE — PROGRESS NOTES
Problem: Risk for Spread of Infection  Goal: Prevent transmission of infectious organism to others  Description: Prevent the transmission of infectious organisms to other patients, staff members, and visitors. Outcome: Progressing Towards Goal     Problem: Falls - Risk of  Goal: *Absence of Falls  Description: Document Beck Led Fall Risk and appropriate interventions in the flowsheet.   Outcome: Progressing Towards Goal  Note: Fall Risk Interventions:  Mobility Interventions: Assess mobility with egress test,Patient to call before getting OOB,Utilize walker, cane, or other assistive device         Medication Interventions: Evaluate medications/consider consulting pharmacy,Patient to call before getting OOB,Teach patient to arise slowly    Elimination Interventions: Call light in reach,Patient to call for help with toileting needs

## 2022-06-29 NOTE — DISCHARGE SUMMARY
Discharge Summary    Patient: Patsy Gallegos MRN: [id number]  CSN: [id number]    YOB: 1935  Age: 82 y.o. Sex: male    DOA: 6/27/2022 LOS:  LOS: 2 days   Discharge Date:      Primary Care Provider:  Joanna Matute MD    Admission Diagnoses: Acute kidney failure [facility]) [N17.9]    Discharge Diagnoses:    Hospital Problems  Date Reviewed: 1/17/2022          Codes Class Noted POA    * (Principal) Acute kidney failure ([address] ICD-10-CM: N17.9  ICD-9-CM: 584.9  6/27/2022 Unknown        History of CVA (cerebrovascular accident) ICD-10-CM: Z86.73  ICD-9-CM: V12.54  6/27/2022 Unknown        Left-sided weakness ICD-10-CM: R53.1  ICD-9-CM: 728.87  6/27/2022 Unknown        Dehydration ICD-10-CM: E86.0  ICD-9-CM: 276.51  6/27/2022 Unknown        Diarrhea ICD-10-CM: R19.7  ICD-9-CM: 787.91  6/27/2022 Unknown        S/P colectomy ICD-10-CM: Z90.49  ICD-9-CM: V45.89  6/27/2022 Unknown        Thyroid disease ICD-10-CM: E07.9  ICD-9-CM: 246. 9  Unknown Unknown    Overview Signed 6/27/2022  4:51 PM by Maury Clark MD     cancer- twice             Hypertension ICD-10-CM: I10  ICD-9-CM: 401.9  Unknown Yes              Discharge Condition: stable     Discharge Medications:     Current Discharge Medication List      START taking these medications    Details   dicyclomine (BENTYL) 10 mg capsule Take 1 Capsule by mouth three (3) times daily for 5 days. Qty: 15 Capsule, Refills: 0  Start date: 6/29/2022, End date: 7/4/2022      metroNIDAZOLE (FlagyL) 500 mg tablet Take 1 Tablet by mouth three (3) times daily for 5 days. Qty: 15 Tablet, Refills: 0  Start date: 6/29/2022, End date: 7/4/2022         CONTINUE these medications which have NOT CHANGED    Details   pantoprazole (PROTONIX) 40 mg tablet Take 40 mg by mouth daily. clopidogreL (PLAVIX) 75 mg tab Take 1 Tablet by mouth daily. Qty: 30 Tablet, Refills: 2      gabapentin (NEURONTIN) 300 mg capsule Take 300 mg by mouth two (2) times a day.       omega 3-dha-epa-fish oil 60- mg cap Take 500 mg by mouth.      metoprolol tartrate (LOPRESSOR) 25 mg tablet Take 1 Tab by mouth. traZODone (DESYREL) 150 mg tablet       aspirin 81 mg chewable tablet Take 81 mg by mouth daily. levothyroxine (LEVOTHROID) 175 mcg tablet Take 175 mcg by mouth Daily (before breakfast). HYDROcodone-acetaminophen (NORCO) 5-325 mg per tablet Take 1 Tablet by mouth every six (6) hours as needed for Pain. atorvastatin (LIPITOR) 80 mg tablet Take 1 Tablet by mouth nightly. Qty: 30 Tablet, Refills: 2             Procedures : none     Consults: None      PHYSICAL EXAM   Visit Vitals  BP (!) 142/68   Pulse 66   Temp 98.1 °F (36.7 °C)   Resp 18   Ht 5' 5\" (1.651 m)   Wt 62.1 kg (136 lb 14.4 oz)   SpO2 96%   BMI 22.78 kg/m²     General: Awake, cooperative, no acute distress    HEENT: NC, Atraumatic. PERRLA, EOMI. Anicteric sclerae. Lungs:  CTA Bilaterally. No Wheezing/Rhonchi/Rales. Heart:  Regular  rhythm,  No murmur, No Rubs, No Gallops  Abdomen: Soft, Non distended, Non tender. +Bowel sounds, colectomy noted with stool   Extremities: No c/c/e  Psych:   Not anxious or agitated. Neurologic:  No acute neurological deficits except mild left side weakness                                  Admission HPI :   Sebastián Wing is a 82 y.o. male with hx of cva with left side weakness, HTN , hx  colectomy presented to ER due diarrhea. He had some n/v for two days, then having diarrhea. He has to change his bag\"20 times\"for these two days. Now no n/v. Associated with abdominal pain. CT abdomen was done in ER, no acute process. He denies any fever no chills. Denies any recent antibiotics use. C. difficile has been sent with other stool started. He had history of stroke with left-sided weakness no changes. Denies any COVID 19 exposure. cr 2.25 .         Denies any slurred speech/headache/cp/n/v/blurred vission/d/c/palpitation/gait change/bleeding. Denies smoking/ any alcohol or drug use. Hospital Course :     Zee Marin a 82 y. o. male with hx of cva with left side weakness, HTN , hx  colectomy presented to ER due diarrhea. he is admitted for dehydration, ifrah and diarrhea , c diff has been rule out and enteric bacteria panel was negative. He received iv hydration for his dehydration and IFRAH. He received flagyl and bentyl -after c diff ruled out . With the treatment, his diarrhea is resolving and his ifrah resolved. He tolerated diet well and no n/v and abdomen pain indicated. Recommend pt-hydrate well at home. Discharge planning discussed with patient and his wife. They  agree  with the plan and no questions and concerns at this point. Activity: Activity as tolerated    Diet: Cardiac Diet    Follow-up: PCP     Disposition: home     Minutes spent on discharge: 45 min       Labs: Results:       Chemistry Recent Labs     06/29/22 0603 06/28/22  0548 06/27/22  1045   * 103* 144*    137 138   K 3.6 4.1 4.3   * 109 106   CO2 23 23 24   BUN 22* 37* 33*   CREA 1.19 2.00* 2.25*   CA 8.4* 8.9 10.3*   AGAP 4 5 8   BUCR 18 19 15   AP  --   --  208*   TP  --   --  9.0*   ALB  --   --  4.4   GLOB  --   --  4.6*   AGRAT  --   --  1.0      CBC w/Diff Recent Labs     06/29/22 0603 06/28/22  0548 06/27/22  1045   WBC 3.4* 5.7 9.7   RBC 4.23* 4.68 5.58   HGB 11.7* 13.0 15.3   HCT 35.3* 40.4 47.7    377 471*   GRANS 52 54 81*   LYMPH 28 24 10*   EOS 6* 3 0      Cardiac Enzymes No results for input(s): CPK, CKND1, ROXANA in the last 72 hours. No lab exists for component: CKRMB, TROIP   Coagulation No results for input(s): PTP, INR, APTT, INREXT in the last 72 hours.     Lipid Panel Lab Results   Component Value Date/Time    Cholesterol, total 221 (H) 01/18/2022 02:47 AM    HDL Cholesterol 58 01/18/2022 02:47 AM    LDL, calculated 147.4 (H) 01/18/2022 02:47 AM    VLDL, calculated 15.6 01/18/2022 02:47 AM    Triglyceride 78 01/18/2022 02:47 AM    CHOL/HDL Ratio 3.8 01/18/2022 02:47 AM      BNP No results for input(s): BNPP in the last 72 hours. Liver Enzymes Recent Labs     06/27/22  1045   TP 9.0*   ALB 4.4   *      Thyroid Studies Lab Results   Component Value Date/Time    TSH 0.01 (L) 06/27/2022 10:45 AM              Significant Diagnostic Studies: XR FEMUR LT 2 V    Result Date: 6/8/2022  EXAM: XR FEMUR LT 2 V CLINICAL INDICATION/HISTORY: prostate cancer, vascular study to go along with   > Additional: None. COMPARISON: None. TECHNIQUE: 2 views of the left femur. _______________ FINDINGS: BONES: Prior left total hip arthroplasty without evidence of hardware-related complication. No evidence of acute fracture or dislocation. No aggressive appearing osseous lytic or blastic lesion. SOFT TISSUES: Unremarkable. _______________     No evidence of acute fracture or dislocation. CT ABD PELV WO CONT    Result Date: 6/27/2022  EXAM: CT of the abdomen and pelvis INDICATION: Abdominal pain. COMPARISON: None. TECHNIQUE: Axial CT imaging of the abdomen and pelvis was performed without intravenous contrast. Multiplanar reformats were generated. One or more dose reduction techniques were used on this CT: automated exposure control, adjustment of the mAs and/or kVp according to patient size, and iterative reconstruction techniques. The specific techniques used on this CT exam have been documented in the patient's electronic medical record. Digital Imaging and Communications in Medicine (DICOM) format image data are available to nonaffiliated external healthcare facilities or entities on a secure, media free, reciprocally searchable basis with patient authorization for at least a 12-month period after this study. . _______________ FINDINGS: LOWER CHEST: Unremarkable. LIVER, BILIARY: Liver is normal. No biliary dilation. Gallbladder is unremarkable.  PANCREAS: Normal. SPLEEN: Normal. ADRENALS: Normal. KIDNEYS/URETERS/BLADDER: Normal. PELVIC ORGANS: Portions obscured by metallic hip hardware artifact. Clenton Reas VASCULATURE: Unremarkable LYMPH NODES: No enlarged lymph nodes. GASTROINTESTINAL TRACT: No bowel dilation or wall thickening. Left-sided colostomy noted. BONES: No acute or aggressive osseous abnormalities identified. OTHER: None. _______________     No acute intra-abdominal pathology identified. XR CHEST PORT    Result Date: 6/27/2022  A portable AP radiograph of the chest was obtained at 1202 hours: INDICATION:  Abdominal pain, diarrhea, dehydration. COMPARISON:  1/17/2022. FINDINGS:  Heart and mediastinum: Unremarkable. Lungs and pleura: There is a prominent thick band of probable scar in the left upper lobe extending to the left hilum similar to the prior study. There is no new consolidation, pleural effusion, or pneumothorax. Aorta: Unremarkable. Bones: Degenerative changes are seen in the spine. Other: None. Stable chest again demonstrating prominent scar left upper lobe with distortion of the adjacent pulmonary vasculature. No acute process. DUPLEX LOWER EXT VENOUS BILAT    Result Date: 6/8/2022  · No evidence of deep vein thrombosis in the left lower extremity. · No evidence of deep vein thrombosis in the right lower extremity.               [facility]     CC: Joon hSerman MD

## 2022-06-29 NOTE — PROGRESS NOTES
Patient discharged home with family at 82 via personal vehicle. Patient denied any pain or discomfort. Discharge instructions reviewed, patient verbalized understanding and denied any questions or concerns. Personal belongings sent home with patient. IV removed. Advised patient to call unit if they shall have any questions or concerns. Vs stable.

## 2022-07-01 LAB
O+P SPEC MICRO: NORMAL
O+P STL CONC: NORMAL
SPECIMEN SOURCE: NORMAL

## 2022-07-20 ENCOUNTER — TRANSCRIBE ORDER (OUTPATIENT)
Dept: SCHEDULING | Age: 87
End: 2022-07-20

## 2022-07-20 DIAGNOSIS — R10.9 ABDOMINAL PAIN: ICD-10-CM

## 2022-07-20 DIAGNOSIS — C61 MALIGNANT NEOPLASM OF PROSTATE (HCC): Primary | ICD-10-CM

## 2022-07-29 ENCOUNTER — HOSPITAL ENCOUNTER (EMERGENCY)
Age: 87
Discharge: HOME | End: 2022-07-29
Attending: EMERGENCY MEDICINE
Payer: MEDICARE

## 2022-07-29 ENCOUNTER — APPOINTMENT (OUTPATIENT)
Dept: GENERAL RADIOLOGY | Age: 87
End: 2022-07-29
Attending: PHYSICIAN ASSISTANT
Payer: MEDICARE

## 2022-07-29 VITALS
HEIGHT: 64 IN | WEIGHT: 140 LBS | TEMPERATURE: 98.2 F | BODY MASS INDEX: 23.9 KG/M2 | HEART RATE: 74 BPM | DIASTOLIC BLOOD PRESSURE: 58 MMHG | RESPIRATION RATE: 16 BRPM | OXYGEN SATURATION: 97 % | SYSTOLIC BLOOD PRESSURE: 122 MMHG

## 2022-07-29 DIAGNOSIS — N18.9 CHRONIC KIDNEY DISEASE, UNSPECIFIED CKD STAGE: ICD-10-CM

## 2022-07-29 DIAGNOSIS — R10.9 RIGHT SIDED ABDOMINAL PAIN: Primary | ICD-10-CM

## 2022-07-29 DIAGNOSIS — Z93.3 PRESENCE OF COLOSTOMY (HCC): ICD-10-CM

## 2022-07-29 LAB
ALBUMIN SERPL-MCNC: 3.5 G/DL (ref 3.4–5)
ALBUMIN/GLOB SERPL: 1 {RATIO} (ref 0.8–1.7)
ALP SERPL-CCNC: 201 U/L (ref 45–117)
ALT SERPL-CCNC: 44 U/L (ref 16–61)
ANION GAP SERPL CALC-SCNC: 7 MMOL/L (ref 3–18)
APPEARANCE UR: CLEAR
AST SERPL-CCNC: 27 U/L (ref 10–38)
BACTERIA URNS QL MICRO: NORMAL /HPF
BASOPHILS # BLD: 0 K/UL (ref 0–0.1)
BASOPHILS NFR BLD: 1 % (ref 0–2)
BILIRUB SERPL-MCNC: 0.2 MG/DL (ref 0.2–1)
BILIRUB UR QL: ABNORMAL
BUN SERPL-MCNC: 19 MG/DL (ref 7–18)
BUN/CREAT SERPL: 14 (ref 12–20)
CALCIUM SERPL-MCNC: 9.3 MG/DL (ref 8.5–10.1)
CHLORIDE SERPL-SCNC: 109 MMOL/L (ref 100–111)
CO2 SERPL-SCNC: 26 MMOL/L (ref 21–32)
COLOR UR: ABNORMAL
CREAT SERPL-MCNC: 1.35 MG/DL (ref 0.6–1.3)
DIFFERENTIAL METHOD BLD: ABNORMAL
EOSINOPHIL # BLD: 0.2 K/UL (ref 0–0.4)
EOSINOPHIL NFR BLD: 6 % (ref 0–5)
EPITH CASTS URNS QL MICRO: NORMAL /LPF (ref 0–5)
ERYTHROCYTE [DISTWIDTH] IN BLOOD BY AUTOMATED COUNT: 16.2 % (ref 11.6–14.5)
GLOBULIN SER CALC-MCNC: 3.5 G/DL (ref 2–4)
GLUCOSE SERPL-MCNC: 111 MG/DL (ref 74–99)
GLUCOSE UR STRIP.AUTO-MCNC: NEGATIVE MG/DL
HCT VFR BLD AUTO: 37.3 % (ref 36–48)
HGB BLD-MCNC: 12.6 G/DL (ref 13–16)
HGB UR QL STRIP: NEGATIVE
HYALINE CASTS URNS QL MICRO: NORMAL /LPF (ref 0–2)
IMM GRANULOCYTES # BLD AUTO: 0 K/UL (ref 0–0.04)
IMM GRANULOCYTES NFR BLD AUTO: 1 % (ref 0–0.5)
KETONES UR QL STRIP.AUTO: ABNORMAL MG/DL
LEUKOCYTE ESTERASE UR QL STRIP.AUTO: ABNORMAL
LIPASE SERPL-CCNC: 193 U/L (ref 73–393)
LYMPHOCYTES # BLD: 1.3 K/UL (ref 0.9–3.6)
LYMPHOCYTES NFR BLD: 32 % (ref 21–52)
MAGNESIUM SERPL-MCNC: 2.5 MG/DL (ref 1.6–2.6)
MCH RBC QN AUTO: 28 PG (ref 24–34)
MCHC RBC AUTO-ENTMCNC: 33.8 G/DL (ref 31–37)
MCV RBC AUTO: 82.9 FL (ref 78–100)
MONOCYTES # BLD: 0.5 K/UL (ref 0.05–1.2)
MONOCYTES NFR BLD: 14 % (ref 3–10)
NEUTS SEG # BLD: 1.8 K/UL (ref 1.8–8)
NEUTS SEG NFR BLD: 47 % (ref 40–73)
NITRITE UR QL STRIP.AUTO: NEGATIVE
NRBC # BLD: 0 K/UL (ref 0–0.01)
NRBC BLD-RTO: 0 PER 100 WBC
PH UR STRIP: 5 [PH] (ref 5–8)
PLATELET # BLD AUTO: 321 K/UL (ref 135–420)
PMV BLD AUTO: 9 FL (ref 9.2–11.8)
POTASSIUM SERPL-SCNC: 4 MMOL/L (ref 3.5–5.5)
PROT SERPL-MCNC: 7 G/DL (ref 6.4–8.2)
PROT UR STRIP-MCNC: NEGATIVE MG/DL
RBC # BLD AUTO: 4.5 M/UL (ref 4.35–5.65)
RBC #/AREA URNS HPF: NORMAL /HPF (ref 0–5)
SODIUM SERPL-SCNC: 142 MMOL/L (ref 136–145)
SP GR UR REFRACTOMETRY: 1.02 (ref 1–1.03)
UROBILINOGEN UR QL STRIP.AUTO: 1 EU/DL (ref 0.2–1)
WBC # BLD AUTO: 3.9 K/UL (ref 4.6–13.2)
WBC URNS QL MICRO: NORMAL /HPF (ref 0–5)

## 2022-07-29 PROCEDURE — 85025 COMPLETE CBC W/AUTO DIFF WBC: CPT

## 2022-07-29 PROCEDURE — 83690 ASSAY OF LIPASE: CPT

## 2022-07-29 PROCEDURE — 74011250637 HC RX REV CODE- 250/637: Performed by: PHYSICIAN ASSISTANT

## 2022-07-29 PROCEDURE — 80053 COMPREHEN METABOLIC PANEL: CPT

## 2022-07-29 PROCEDURE — 81001 URINALYSIS AUTO W/SCOPE: CPT

## 2022-07-29 PROCEDURE — 83735 ASSAY OF MAGNESIUM: CPT

## 2022-07-29 PROCEDURE — 99284 EMERGENCY DEPT VISIT MOD MDM: CPT

## 2022-07-29 PROCEDURE — 74022 RADEX COMPL AQT ABD SERIES: CPT

## 2022-07-29 RX ORDER — HYDROCODONE BITARTRATE AND ACETAMINOPHEN 5; 325 MG/1; MG/1
1 TABLET ORAL
Status: COMPLETED | OUTPATIENT
Start: 2022-07-29 | End: 2022-07-29

## 2022-07-29 RX ADMIN — HYDROCODONE BITARTRATE AND ACETAMINOPHEN 1 TABLET: 5; 325 TABLET ORAL at 20:34

## 2022-07-29 NOTE — ED PROVIDER NOTES
EMERGENCY DEPARTMENT HISTORY AND PHYSICAL EXAM    Date: 7/29/2022  Patient Name: Daniella Krishnan    History of Presenting Illness     Time Seen:6:18 PM    Chief Complaint   Patient presents with    Abdominal Pain       History Provided By: Patient    Additional History (Context):   Daniella Krishnan is a 82 y.o. male who presents to the emergency room with c/o right-sided abdominal pain/right side pain that has been ongoing since Monday. Pain is worse with movement. It is a sharp pain, fleeting pain. Patient has a history of colostomy. Colostomy has been functioning fine filling with gas and stool. No blood in the stool. Patient is eating and drinking fine. No difficulty urinating. No frequency, urgency or dysuria. No hematuria. No documented fever. Patient was admitted to the hospital back in June 2022 for diarrhea, left-sided weakness and acute renal failure. CAT scan performed at that time was negative on his abdomen. PCP: Antonella Correia MD    Current Outpatient Medications   Medication Sig Dispense Refill    HYDROcodone-acetaminophen (NORCO) 5-325 mg per tablet Take 1 Tablet by mouth every six (6) hours as needed for Pain.      pantoprazole (PROTONIX) 40 mg tablet Take 40 mg by mouth daily. cyanocobalamin/folic ac/vit B6 (FOLIC ACID-VIT W3-THF N00 PO) Take  by mouth. cholecalciferol (VITAMIN D3) (5000 Units/125 mcg) tab tablet Take 5,000 Units by mouth daily. ferrous sulfate (FeroSuL) 325 mg (65 mg iron) tablet Take 325 mg by mouth Daily (before breakfast). atorvastatin (LIPITOR) 80 mg tablet Take 1 Tablet by mouth nightly. (Patient not taking: Reported on 6/27/2022) 30 Tablet 2    clopidogreL (PLAVIX) 75 mg tab Take 1 Tablet by mouth daily. 30 Tablet 2    docusate sodium (COLACE) 100 mg capsule Take 100 mg by mouth two (2) times a day. (Patient not taking: Reported on 6/27/2022)      gabapentin (NEURONTIN) 300 mg capsule Take 300 mg by mouth two (2) times a day. omega 3-dha-epa-fish oil 60- mg cap Take 500 mg by mouth. ascorbic acid, vitamin C, (VITAMIN C) 100 mg tab VITAMIN C TABS (Patient not taking: Reported on 6/27/2022)      metoprolol tartrate (LOPRESSOR) 25 mg tablet Take 1 Tab by mouth. traZODone (DESYREL) 150 mg tablet       co-enzyme Q-10 (CO Q-10) 100 mg capsule  (Patient not taking: Reported on 6/27/2022)      lisinopril (PRINIVIL, ZESTRIL) 20 mg tablet Take  by mouth daily. (Patient not taking: Reported on 6/27/2022)      multivitamin (ONE A DAY) tablet Take 1 Tab by mouth daily. aspirin 81 mg chewable tablet Take 81 mg by mouth daily. AMLODIPINE BESYLATE (NORVASC PO) Take 10 mg by mouth daily. levothyroxine (LEVOTHROID) 175 mcg tablet Take 175 mcg by mouth Daily (before breakfast). 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    Cancer [facility])     prostate and thyroid    Chest pain, precordial     Dementia ([facility] [address]     DJD (degenerative joint disease)     Hyperlipidemia     Hypertension     Stroke ([facility] [address]     Syncope     Thyroid disease     cancer- twice       Past Surgical History:  Past Surgical History:   Procedure Laterality Date    HX CARPAL TUNNEL RELEASE      twice    HX HIP REPLACEMENT      two    HX ORTHOPAEDIC      bilateral hip surgery    HX PROSTATECTOMY      cancer    HX THORACOTOMY      dx:  amyloidosis    PA REPAIR ROTATOR CUFF,CHRONIC      three times    PA THYROIDECTOMY      cancer       Family History:  History reviewed. No pertinent family history. Social History:  Social History     Tobacco Use    Smoking status: Never    Smokeless tobacco: Never   Substance Use Topics    Alcohol use: No    Drug use: No       Allergies: Allergies   Allergen Reactions    Lisinopril Other (comments)     Unknown reaction.     Morphine Other (comments)     \"goes crazy\"         Review of Systems   Review of Systems   Constitutional:  Negative for chills, fatigue and fever. Respiratory:  Negative for shortness of breath. Cardiovascular:  Negative for chest pain and palpitations. Gastrointestinal:  Positive for abdominal pain. Negative for blood in stool, constipation, diarrhea, nausea and vomiting. Genitourinary:  Negative for decreased urine volume, dysuria, flank pain, frequency, hematuria and urgency. Musculoskeletal:  Negative for back pain. Neurological:  Negative for dizziness, light-headedness and headaches. All other systems reviewed and are negative. Physical Exam     Vitals:    07/29/22 1719   BP: (!) 122/58   Pulse: 74   Resp: 16   Temp: 98.2 °F (36.8 °C)   SpO2: 97%   Weight: 63.5 kg (140 lb)   Height: 5' 4\" (1.626 m)     Physical Exam  Vitals and nursing note reviewed. Constitutional:       General: He is not in acute distress. Appearance: Normal appearance. He is well-developed, well-groomed and normal weight. He is not ill-appearing. HENT:      Mouth/Throat:      Mouth: Mucous membranes are moist.      Pharynx: Oropharynx is clear. Eyes:      General: No scleral icterus. Extraocular Movements: Extraocular movements intact. Conjunctiva/sclera: Conjunctivae normal.      Pupils: Pupils are equal, round, and reactive to light. Cardiovascular:      Rate and Rhythm: Normal rate and regular rhythm. Heart sounds: Normal heart sounds. Pulmonary:      Effort: Pulmonary effort is normal.      Breath sounds: Normal breath sounds. Abdominal:      General: Abdomen is flat. Bowel sounds are normal.      Palpations: Abdomen is soft. There is no hepatomegaly. Tenderness: There is abdominal tenderness. There is no right CVA tenderness, left CVA tenderness, guarding or rebound. Comments: Colostomy in place left lower quadrant  Functioning  Tenderness along the right side of the abdomen. Pain seems to be mostly just below ribs. Pain worse with palpation and movement.       Musculoskeletal:      Cervical back: Neck supple. Skin:     General: Skin is warm and dry. Neurological:      Mental Status: He is alert and oriented to person, place, and time. Psychiatric:         Behavior: Behavior is cooperative. Nursing note and vitals reviewed      Diagnostic Study Results     Labs -     Recent Results (from the past 24 hour(s))   CBC WITH AUTOMATED DIFF    Collection Time: 07/29/22  6:10 PM   Result Value Ref Range    WBC 3.9 (L) 4.6 - 13.2 K/uL    RBC 4.50 4.35 - 5.65 M/uL    HGB 12.6 (L) 13.0 - 16.0 g/dL    HCT 37.3 36.0 - 48.0 %    MCV 82.9 78.0 - 100.0 FL    MCH 28.0 24.0 - 34.0 PG    MCHC 33.8 31.0 - 37.0 g/dL    RDW 16.2 (H) 11.6 - 14.5 %    PLATELET 259 110 - 634 K/uL    MPV 9.0 (L) 9.2 - 11.8 FL    NRBC 0.0 0  WBC    ABSOLUTE NRBC 0.00 0.00 - 0.01 K/uL    NEUTROPHILS 47 40 - 73 %    LYMPHOCYTES 32 21 - 52 %    MONOCYTES 14 (H) 3 - 10 %    EOSINOPHILS 6 (H) 0 - 5 %    BASOPHILS 1 0 - 2 %    IMMATURE GRANULOCYTES 1 (H) 0.0 - 0.5 %    ABS. NEUTROPHILS 1.8 1.8 - 8.0 K/UL    ABS. LYMPHOCYTES 1.3 0.9 - 3.6 K/UL    ABS. MONOCYTES 0.5 0.05 - 1.2 K/UL    ABS. EOSINOPHILS 0.2 0.0 - 0.4 K/UL    ABS. BASOPHILS 0.0 0.0 - 0.1 K/UL    ABS. IMM. GRANS. 0.0 0.00 - 0.04 K/UL    DF AUTOMATED     METABOLIC PANEL, COMPREHENSIVE    Collection Time: 07/29/22  6:10 PM   Result Value Ref Range    Sodium 142 136 - 145 mmol/L    Potassium 4.0 3.5 - 5.5 mmol/L    Chloride 109 100 - 111 mmol/L    CO2 26 21 - 32 mmol/L    Anion gap 7 3.0 - 18 mmol/L    Glucose 111 (H) 74 - 99 mg/dL    BUN 19 (H) 7.0 - 18 MG/DL    Creatinine 1.35 (H) 0.6 - 1.3 MG/DL    BUN/Creatinine ratio 14 12 - 20      GFR est AA >60 >60 ml/min/1.73m2    GFR est non-AA 50 (L) >60 ml/min/1.73m2    Calcium 9.3 8.5 - 10.1 MG/DL    Bilirubin, total 0.2 0.2 - 1.0 MG/DL    ALT (SGPT) 44 16 - 61 U/L    AST (SGOT) 27 10 - 38 U/L    Alk.  phosphatase 201 (H) 45 - 117 U/L    Protein, total 7.0 6.4 - 8.2 g/dL    Albumin 3.5 3.4 - 5.0 g/dL    Globulin 3.5 2.0 - 4.0 g/dL A-G Ratio 1.0 0.8 - 1.7     URINALYSIS W/ RFLX MICROSCOPIC    Collection Time: 07/29/22  6:10 PM   Result Value Ref Range    Color DARK YELLOW      Appearance CLEAR      Specific gravity 1.021 1.005 - 1.030      pH (UA) 5.0 5.0 - 8.0      Protein Negative NEG mg/dL    Glucose Negative NEG mg/dL    Ketone TRACE (A) NEG mg/dL    Bilirubin SMALL (A) NEG      Blood Negative NEG      Urobilinogen 1.0 0.2 - 1.0 EU/dL    Nitrites Negative NEG      Leukocyte Esterase SMALL (A) NEG     MAGNESIUM    Collection Time: 07/29/22  6:10 PM   Result Value Ref Range    Magnesium 2.5 1.6 - 2.6 mg/dL   LIPASE    Collection Time: 07/29/22  6:10 PM   Result Value Ref Range    Lipase 193 73 - 393 U/L   URINE MICROSCOPIC ONLY    Collection Time: 07/29/22  6:10 PM   Result Value Ref Range    WBC 4 to 10 0 - 5 /hpf    RBC 0 to 3 0 - 5 /hpf    Epithelial cells OCCASIONAL 0 - 5 /lpf    Bacteria NONE SEEN NEG /hpf    Hyaline cast 0 to 3 0 - 2 /lpf       Radiologic Studies   XR ABD ACUTE W 1 V CHEST   Final Result      No bowel obstruction or other acute findings. CT Results  (Last 48 hours)      None          CXR Results  (Last 48 hours)                 07/29/22 1938  XR ABD ACUTE W 1 V CHEST Final result    Impression:      No bowel obstruction or other acute findings. Narrative:  EXAM: XR ABD ACUTE W 1 V CHEST       CLINICAL INDICATION/HISTORY: right side pain       COMPARISON: CT abdomen/pelvis 6/27/2022       TECHNIQUE: Frontal view of the chest along with flat and upright views of the   abdomen were acquired   _______________       FINDINGS:       CHEST: Staple line and bandlike atelectasis/scarring throughout suprahilar left   lung. Cardiac and mediastinal silhouette are within normal limits. The   pulmonary vasculature is unremarkable. The lungs are clear with no   consolidation, effusion, or pneumothorax. No free intraperitoneal air is seen   under the diaphragm.         ABDOMEN: Nonobstructive bowel gas pattern. The soft tissue planes and bony structures appear normal.         _______________                     Medical Decision Making   I am the first provider for this patient. I reviewed the vital signs, available nursing notes, past medical history, past surgical history, family history and social history. Vital Signs-Reviewed the patient's vital signs. Records Reviewed: Nursing Notes, Old Medical Records, Previous Radiology Studies, and Previous Laboratory Studies    DDX:  Right side pain - seems more musculoskeletal  Consider renal  Doubt obstructive process      Procedures:  Procedures    ED Course:   Initial assessment performed. The patients presenting problems have been discussed, and they are in agreement with the care plan formulated and outlined with them. I have encouraged them to ask questions as they arise throughout their visit. ED Physician Discussion Note:   CBC - low WBC 3.9  UA small LE  Chem - elevated glucose 111  Renal 19 / 1.35 (chronic)  LFTs -  otherwise nl  Lipase NL  X ray neg    Seen here month ago - CT negative    Did not feel patient warranted another CT scan  VSS   Labs grossly normal    Patient seemed very relaxed at rest. Only uncomfortable when moving. Plan to DC home. Takes chronic pain meds  Caregiver little upset prior to DC he didn't receive any pain medication. Typically has [address] at home to take PRN. Given 1 prior to DC    Follow up PCP    DC            Diagnosis and Disposition       DISCHARGE NOTE:  Rodolfo Lopez  results have been reviewed with him. He has been counseled regarding his diagnosis, treatment, and plan. He verbally conveys understanding and agreement of the signs, symptoms, diagnosis, treatment and prognosis and additionally agrees to follow up as discussed. He also agrees with the care-plan and conveys that all of his questions have been answered.   I have also provided discharge instructions for him that include: educational information regarding their diagnosis and treatment, and list of reasons why they would want to return to the ED prior to their follow-up appointment, should his condition change. He has been provided with education for proper emergency department utilization. CLINICAL IMPRESSION:    1. Right sided abdominal pain    2. Chronic kidney disease, unspecified CKD stage    3. Presence of colostomy ([facility] [address]        PLAN:  1. D/C Home  2. Discharge Medication List as of 7/29/2022  8:13 PM        3. Follow-up Information       Follow up With Specialties Details Why Patel Henson MD Internal Medicine Physician Go to  Follow-up with your primary care provider next week [place] [id number] [address]      [facility] EMERGENCY DEPT Emergency Medicine  If symptoms worsen, As needed [address] [id number]  [phone]          ____________________________________     Please note that this dictation was completed with [employer], the computer voice recognition software. Quite often unanticipated grammatical, syntax, homophones, and other interpretive errors are inadvertently transcribed by the computer software. Please disregard these errors. Please excuse any errors that have escaped final proofreading.

## 2022-07-29 NOTE — ED NOTES
Verbal shift change report given to Favio Brandt RN (oncoming nurse). Report included the following information SBAR, ED Summary and MAR.

## 2022-07-29 NOTE — ED TRIAGE NOTES
Pt arrives ambulatory to ER describing a stabbing RUQ pain that is especially noticeable during a deep breath. Started Monday.

## 2022-07-30 NOTE — DISCHARGE INSTRUCTIONS
Your evaluated for right sided abdominal pain/right side pain  Your pain is thought to be more musculoskeletal in nature  Continue chronic pain medication use  Consider heat/warm compresses to the area  Gentle stretching  Worse?   Return to ER immediately

## 2022-08-10 ENCOUNTER — HOSPITAL ENCOUNTER (OUTPATIENT)
Dept: NUCLEAR MEDICINE | Age: 87
Discharge: HOME | End: 2022-08-10
Attending: STUDENT IN AN ORGANIZED HEALTH CARE EDUCATION/TRAINING PROGRAM
Payer: MEDICARE

## 2022-08-10 DIAGNOSIS — R10.9 ABDOMINAL PAIN: ICD-10-CM

## 2022-08-10 DIAGNOSIS — C61 MALIGNANT NEOPLASM OF PROSTATE (HCC): ICD-10-CM

## 2022-08-10 PROCEDURE — 78306 BONE IMAGING WHOLE BODY: CPT

## 2022-08-22 ENCOUNTER — HOSPITAL ENCOUNTER (OUTPATIENT)
Dept: CT IMAGING | Age: 87
Discharge: HOME | End: 2022-08-22
Attending: STUDENT IN AN ORGANIZED HEALTH CARE EDUCATION/TRAINING PROGRAM
Payer: MEDICARE

## 2022-08-22 DIAGNOSIS — R10.9 ABDOMINAL PAIN: ICD-10-CM

## 2022-08-22 DIAGNOSIS — C61 MALIGNANT NEOPLASM OF PROSTATE (HCC): ICD-10-CM

## 2022-08-22 PROCEDURE — 74177 CT ABD & PELVIS W/CONTRAST: CPT

## 2022-08-22 PROCEDURE — 74011000250 HC RX REV CODE- 250: Performed by: RADIOLOGY

## 2022-08-22 PROCEDURE — 74011000636 HC RX REV CODE- 636: Performed by: RADIOLOGY

## 2022-08-22 RX ORDER — BARIUM SULFATE 20 MG/ML
900 SUSPENSION ORAL
Status: COMPLETED | OUTPATIENT
Start: 2022-08-22 | End: 2022-08-22

## 2022-08-22 RX ADMIN — IOPAMIDOL 100 ML: 612 INJECTION, SOLUTION INTRAVENOUS at 10:16

## 2022-08-22 RX ADMIN — BARIUM SULFATE 900 ML: 20 SUSPENSION ORAL at 10:16
